# Patient Record
Sex: FEMALE | Race: WHITE | NOT HISPANIC OR LATINO | Employment: OTHER | URBAN - METROPOLITAN AREA
[De-identification: names, ages, dates, MRNs, and addresses within clinical notes are randomized per-mention and may not be internally consistent; named-entity substitution may affect disease eponyms.]

---

## 2017-03-28 ENCOUNTER — TELEPHONE (OUTPATIENT)
Dept: RHEUMATOLOGY | Facility: CLINIC | Age: 67
End: 2017-03-28

## 2017-03-28 NOTE — TELEPHONE ENCOUNTER
Called patient to confirm appointment at [facility] on 3/39/2017 , patient not available left message .

## 2017-05-08 ENCOUNTER — TELEPHONE (OUTPATIENT)
Dept: OBSTETRICS AND GYNECOLOGY | Facility: CLINIC | Age: 67
End: 2017-05-08

## 2017-05-08 NOTE — TELEPHONE ENCOUNTER
She thinks her breast implant is leaking.  One breast is smaller than the other.  Her  states the surgeon that placed the implants is no longer practicing.    Pt has a history of breast cancer.  She was seen in November for a breast lump but I do not see the results of the imaging in her chart.  Her  thinks she went to [facility].    Offered to schedule her with the nurse practitioner at [facility] for evaluation.  He did not schedule at this time.

## 2017-05-08 NOTE — TELEPHONE ENCOUNTER
Dr Garza pt,Phillip has [place] calling regarding [place] he states that she thinks her implant has ruptured and I wasn't sure if she should come to see dr garza or not.She had breast cancer and has implants. Brayan [phone]

## 2017-05-09 NOTE — TELEPHONE ENCOUNTER
MD Bridgett Mao RN        Caller: Unspecified (Yesterday, 12:09 PM)                     So you referred taina to a breast surgeon?  If so, that's what I would have done too       Scheduled with Anita Lr at [facility] 5/10 at 1000.     Spoke with  at [facility] who spoke with Anita who recommended she breast surgeon.      Gave  number to [facility] to schedule an appt at a convenient time [phone].

## 2017-08-17 ENCOUNTER — TELEPHONE (OUTPATIENT)
Dept: OBSTETRICS AND GYNECOLOGY | Facility: CLINIC | Age: 67
End: 2017-08-17

## 2018-02-19 ENCOUNTER — TELEPHONE (OUTPATIENT)
Dept: OBSTETRICS AND GYNECOLOGY | Facility: CLINIC | Age: 68
End: 2018-02-19

## 2018-02-28 ENCOUNTER — OFFICE VISIT (OUTPATIENT)
Dept: OBSTETRICS AND GYNECOLOGY | Facility: CLINIC | Age: 68
End: 2018-02-28
Payer: MEDICARE

## 2018-02-28 VITALS
DIASTOLIC BLOOD PRESSURE: 60 MMHG | HEIGHT: 61 IN | BODY MASS INDEX: 16.35 KG/M2 | WEIGHT: 86.63 LBS | SYSTOLIC BLOOD PRESSURE: 100 MMHG

## 2018-02-28 DIAGNOSIS — Z98.82 PRESENCE OF BILATERAL BREAST IMPLANT: ICD-10-CM

## 2018-02-28 DIAGNOSIS — N63.22 BREAST LUMP ON LEFT SIDE AT 10 O'CLOCK POSITION: ICD-10-CM

## 2018-02-28 DIAGNOSIS — N63.12 BREAST LUMP ON RIGHT SIDE AT 1 O'CLOCK POSITION: ICD-10-CM

## 2018-02-28 DIAGNOSIS — Z85.3 HISTORY OF BREAST CANCER: Primary | ICD-10-CM

## 2018-02-28 PROCEDURE — 99213 OFFICE O/P EST LOW 20 MIN: CPT | Mod: PBBFAC,PN | Performed by: NURSE PRACTITIONER

## 2018-02-28 PROCEDURE — 99213 OFFICE O/P EST LOW 20 MIN: CPT | Mod: S$PBB,,, | Performed by: NURSE PRACTITIONER

## 2018-02-28 PROCEDURE — 99999 PR PBB SHADOW E&M-EST. PATIENT-LVL III: CPT | Mod: PBBFAC,,, | Performed by: NURSE PRACTITIONER

## 2018-02-28 RX ORDER — FUROSEMIDE 40 MG/1
TABLET ORAL
COMMUNITY
Start: 2018-02-20 | End: 2019-11-15 | Stop reason: SDUPTHER

## 2018-02-28 RX ORDER — BUTALBITAL, ACETAMINOPHEN AND CAFFEINE 50; 325; 40 MG/1; MG/1; MG/1
TABLET ORAL
COMMUNITY
Start: 2018-02-23 | End: 2019-10-28

## 2018-02-28 RX ORDER — CEFDINIR 300 MG/1
CAPSULE ORAL
COMMUNITY
Start: 2018-02-23 | End: 2019-01-30

## 2018-02-28 RX ORDER — POTASSIUM CHLORIDE 750 MG/1
TABLET, EXTENDED RELEASE ORAL
COMMUNITY
Start: 2017-12-15

## 2018-02-28 RX ORDER — OXYCODONE HYDROCHLORIDE 20 MG/1
TABLET, FILM COATED, EXTENDED RELEASE ORAL
Refills: 0 | COMMUNITY
Start: 2018-02-07 | End: 2019-10-28

## 2018-02-28 RX ORDER — TIZANIDINE 2 MG/1
TABLET ORAL
COMMUNITY
Start: 2017-11-27 | End: 2019-11-15 | Stop reason: SDUPTHER

## 2018-02-28 RX ORDER — MELOXICAM 15 MG/1
TABLET ORAL
Refills: 0 | COMMUNITY
Start: 2018-02-06 | End: 2019-11-15 | Stop reason: SDUPTHER

## 2018-02-28 RX ORDER — MOMETASONE FUROATE 1 MG/G
OINTMENT TOPICAL
COMMUNITY
Start: 2017-11-27 | End: 2019-10-28

## 2018-02-28 RX ORDER — CIPROFLOXACIN AND DEXAMETHASONE 3; 1 MG/ML; MG/ML
SUSPENSION/ DROPS AURICULAR (OTIC)
COMMUNITY
Start: 2018-02-23 | End: 2019-10-28

## 2018-02-28 RX ORDER — SULFAMETHOXAZOLE AND TRIMETHOPRIM 400; 80 MG/1; MG/1
1 TABLET ORAL 2 TIMES DAILY
Refills: 0 | COMMUNITY
Start: 2018-01-10 | End: 2019-01-30

## 2018-02-28 RX ORDER — CEPHALEXIN 500 MG/1
CAPSULE ORAL
COMMUNITY
Start: 2018-01-30 | End: 2019-10-28

## 2018-02-28 RX ORDER — TOPIRAMATE 25 MG/1
TABLET ORAL
COMMUNITY
Start: 2018-02-26 | End: 2019-11-15 | Stop reason: SDUPTHER

## 2018-02-28 RX ORDER — MUPIROCIN 20 MG/G
OINTMENT TOPICAL
Refills: 0 | COMMUNITY
Start: 2017-12-14 | End: 2019-10-28

## 2018-02-28 NOTE — PROGRESS NOTES
"Chief Complaint: Breast, lump to left     (Dr. Álvarez patient)    Last Pap: 2016  Normal,  HPV negative    Last Mammogram/Breast Imaging:    HPI:      [place] Alexandre is a 67 y.o.  who presents complaining of lump to left breast.  States lump is non-tender, but round and firm; denies changes in skin texture/color.  She has bilateral breast implants, which have been replaced several times since her complete double mastectomy was done.  Patient does not have regular monthly menses. No LMP recorded. Patient is postmenopausal.       ROS:     GENERAL: Denies unintentional weight gain or weight loss. Feeling well overall.   ABDOMEN: Denies abdominal pain, constipation, diarrhea, nausea, vomiting, change in appetite.   URINARY: Denies frequency, dysuria, hematuria.  BREAST: See HPI  GYN: Denies abnormal bleeding or discharge.    Physical Exam:      PHYSICAL EXAM:  /60   Ht 5' 1" (1.549 m)   Wt 39.3 kg (86 lb 10.3 oz)   BMI 16.37 kg/m²   Body mass index is 16.37 kg/m².      APPEARANCE: Well nourished, well developed, in no acute distress.  BREASTS: Left breast:  approx 2 x 2 cm round mass noted directly above areola at 10 o'clock; feels as though it may c/w valve on implant.  Non-tender, non-mobile; no changes in skin texture/color; no other masses/lumps palpated.                       Right:  Soft, non-tender; small lump also noted, approx 1 x 1 cm at 1 o'clock position; again feels like may be c/w implant valve.  Non-mobile; no other masses/lumps noted; no changes in skin texture/color.  ABDOMEN: Soft.  No tenderness or masses.    EXTREMITIES: No edema.         Assessment/Plan:   History of breast cancer  -     Mammo Digital Diagnostic Bilat with [place]; Future  -     US Breast Bilateral Complete; Future; Expected date: 2018    Breast lump on left side at 10 o'clock position  -     Mammo Digital Diagnostic Bilat with [facility]; Future  -     US Breast Bilateral Complete; Future; Expected date: " 02/28/2018    Breast lump on right side at 1 o'clock position  -     Mammo Digital Diagnostic Bilat with [facility]; Future  -     US Breast Bilateral Complete; Future; Expected date: 02/28/2018    Presence of bilateral breast implant  -     Mammo Digital Diagnostic Bilat with [facility]; Future  -     US Breast Bilateral Complete; Future; Expected date: 02/28/2018          Orders Placed This Encounter   Procedures    Mammo Digital Diagnostic Bilat with [facility]     Standing Status:   Future     Number of Occurrences:   1     Standing Expiration Date:   4/30/2019     Order Specific Question:   Reason for Exam:     Answer:   Diagnostic: lump Left breast @ 10 o'clock (2 x 2 cm);  lump Right breast ! 1 o'clock (1x1 cm)     Order Specific Question:   May the Radiologist modify the order per protocol to meet the clinical needs of the patient?     Answer:   Yes    US Breast Bilateral Complete     Standing Status:   Future     Number of Occurrences:   1     Standing Expiration Date:   4/28/2019     Order Specific Question:   Reason for Exam:     Answer:   Diagnostic: lump Left breast @ 10 o'clock (2 x 2 cm);  lump Right breast ! 1 o'clock (1x1 cm)     Order Specific Question:   May the Radiologist modify the order per protocol to meet the clinical needs of the patient?     Answer:   Yes         Follow-up if symptoms worsen or fail to improve.

## 2018-04-12 ENCOUNTER — TELEPHONE (OUTPATIENT)
Dept: OBSTETRICS AND GYNECOLOGY | Facility: CLINIC | Age: 68
End: 2018-04-12

## 2019-01-28 ENCOUNTER — TELEPHONE (OUTPATIENT)
Dept: OBSTETRICS AND GYNECOLOGY | Facility: CLINIC | Age: 69
End: 2019-01-28

## 2019-01-28 NOTE — TELEPHONE ENCOUNTER
Pt c/o breast pain and both breasts are hard.  She has a h/o breast cancer and had a mastectomy.  Has had saline implants for about 10 years.  Requesting an appt for a breast exam.  Scheduled Wednesday with Birdie.  Last mammo 3/27/18

## 2019-01-30 ENCOUNTER — OFFICE VISIT (OUTPATIENT)
Dept: OBSTETRICS AND GYNECOLOGY | Facility: CLINIC | Age: 69
End: 2019-01-30
Payer: MEDICARE

## 2019-01-30 DIAGNOSIS — T85.49XA RIPPLING OF BREAST IMPLANT, INITIAL ENCOUNTER: ICD-10-CM

## 2019-01-30 DIAGNOSIS — N64.52 DISCHARGE FROM RIGHT NIPPLE: ICD-10-CM

## 2019-01-30 DIAGNOSIS — Z90.13 HX OF BILATERAL MASTECTOMY: ICD-10-CM

## 2019-01-30 DIAGNOSIS — Z98.82 PRESENCE OF BILATERAL BREAST IMPLANT: ICD-10-CM

## 2019-01-30 DIAGNOSIS — N64.4 BILATERAL MASTODYNIA: Primary | ICD-10-CM

## 2019-01-30 DIAGNOSIS — Z85.3 PERSONAL HISTORY OF BREAST CANCER: ICD-10-CM

## 2019-01-30 PROCEDURE — 99213 OFFICE O/P EST LOW 20 MIN: CPT | Mod: S$PBB,,, | Performed by: NURSE PRACTITIONER

## 2019-01-30 PROCEDURE — 99213 PR OFFICE/OUTPT VISIT, EST, LEVL III, 20-29 MIN: ICD-10-PCS | Mod: S$PBB,,, | Performed by: NURSE PRACTITIONER

## 2019-01-30 PROCEDURE — 99999 PR PBB SHADOW E&M-EST. PATIENT-LVL V: CPT | Mod: PBBFAC,,, | Performed by: NURSE PRACTITIONER

## 2019-01-30 PROCEDURE — 99215 OFFICE O/P EST HI 40 MIN: CPT | Mod: PBBFAC,PN | Performed by: NURSE PRACTITIONER

## 2019-01-30 PROCEDURE — 99999 PR PBB SHADOW E&M-EST. PATIENT-LVL V: ICD-10-PCS | Mod: PBBFAC,,, | Performed by: NURSE PRACTITIONER

## 2019-01-30 RX ORDER — FLUTICASONE PROPIONATE 50 MCG
SPRAY, SUSPENSION (ML) NASAL
COMMUNITY
End: 2019-10-28

## 2019-01-30 RX ORDER — BUTALBITAL, ACETAMINOPHEN, CAFFEINE AND CODEINE PHOSPHATE 50; 325; 40; 30 MG/1; MG/1; MG/1; MG/1
CAPSULE ORAL
COMMUNITY
End: 2019-10-28

## 2019-01-30 RX ORDER — CLOBETASOL PROPIONATE 0.5 MG/G
CREAM TOPICAL
COMMUNITY
End: 2019-10-28

## 2019-01-30 RX ORDER — LEVOFLOXACIN 500 MG/1
TABLET, FILM COATED ORAL
COMMUNITY
End: 2019-10-28

## 2019-01-30 RX ORDER — CLINDAMYCIN HYDROCHLORIDE 150 MG/1
CAPSULE ORAL
COMMUNITY
End: 2019-10-28

## 2019-01-30 RX ORDER — GABAPENTIN 100 MG/1
CAPSULE ORAL
COMMUNITY
End: 2019-10-28

## 2019-01-30 RX ORDER — ALBUTEROL SULFATE 0.83 MG/ML
SOLUTION RESPIRATORY (INHALATION)
COMMUNITY
End: 2019-11-15 | Stop reason: SDUPTHER

## 2019-01-30 RX ORDER — BUTALBITAL, ASPIRIN, CAFFEINE AND CODEINE PHOSPHATE 50; 325; 40; 30 MG/1; MG/1; MG/1; MG/1
CAPSULE ORAL
COMMUNITY
End: 2019-10-28

## 2019-01-30 RX ORDER — DOXYCYCLINE 100 MG/1
CAPSULE ORAL
COMMUNITY
End: 2019-10-28

## 2019-01-30 RX ORDER — CEFUROXIME AXETIL 250 MG/1
TABLET ORAL
COMMUNITY
End: 2019-10-28

## 2019-01-30 RX ORDER — HYDROCODONE BITARTRATE AND ACETAMINOPHEN 7.5; 325 MG/1; MG/1
TABLET ORAL
COMMUNITY
End: 2019-10-28

## 2019-01-30 RX ORDER — METHADONE HYDROCHLORIDE 5 MG/1
TABLET ORAL
COMMUNITY
End: 2019-10-28

## 2019-01-30 RX ORDER — CYCLOSPORINE 0.5 MG/ML
EMULSION OPHTHALMIC
COMMUNITY
End: 2019-11-15 | Stop reason: SDUPTHER

## 2019-01-30 RX ORDER — SULFACETAMIDE SODIUM AND PREDNISOLONE SODIUM PHOSPHATE 100; 2.3 MG/ML; MG/ML
SOLUTION/ DROPS OPHTHALMIC
COMMUNITY
End: 2019-10-28

## 2019-01-30 RX ORDER — METHADONE HYDROCHLORIDE 10 MG/1
TABLET ORAL
COMMUNITY

## 2019-01-30 RX ORDER — HYDROCORTISONE 25 MG/G
CREAM TOPICAL
COMMUNITY
End: 2019-10-28

## 2019-01-30 RX ORDER — FLUOROMETHOLONE 1 MG/ML
SUSPENSION/ DROPS OPHTHALMIC
COMMUNITY
End: 2019-10-28

## 2019-01-30 RX ORDER — HYDROCODONE BITARTRATE AND ACETAMINOPHEN 10; 325 MG/1; MG/1
TABLET ORAL
COMMUNITY
End: 2019-10-28

## 2019-01-30 RX ORDER — FLUOCINONIDE TOPICAL SOLUTION USP, 0.05% 0.5 MG/ML
SOLUTION TOPICAL
COMMUNITY
End: 2019-10-28

## 2019-01-30 RX ORDER — MUPIROCIN 20 MG/G
OINTMENT TOPICAL
COMMUNITY
End: 2019-10-28

## 2019-01-30 RX ORDER — LEVOFLOXACIN 750 MG/1
TABLET ORAL
COMMUNITY
End: 2019-10-28

## 2019-01-30 RX ORDER — KETOCONAZOLE 20 MG/ML
SHAMPOO, SUSPENSION TOPICAL
COMMUNITY
End: 2019-10-28

## 2019-01-30 RX ORDER — HYDROCORTISONE BUTYRATE 1 MG/ML
SOLUTION TOPICAL
COMMUNITY
End: 2019-10-28

## 2019-01-30 RX ORDER — FROVATRIPTAN SUCCINATE 2.5 MG/1
TABLET, FILM COATED ORAL
COMMUNITY
End: 2019-10-28

## 2019-01-30 RX ORDER — CYPROHEPTADINE HYDROCHLORIDE 4 MG/1
TABLET ORAL
COMMUNITY
Start: 2018-10-31 | End: 2019-10-28

## 2019-01-30 RX ORDER — ERYTHROMYCIN 5 MG/G
OINTMENT OPHTHALMIC
COMMUNITY
End: 2019-10-28

## 2019-01-30 RX ORDER — DESONIDE 0.5 MG/G
CREAM TOPICAL
COMMUNITY
End: 2019-10-28

## 2019-01-30 RX ORDER — DOXEPIN HYDROCHLORIDE 10 MG/1
CAPSULE ORAL
COMMUNITY
End: 2019-11-18

## 2019-01-30 RX ORDER — CLOTRIMAZOLE AND BETAMETHASONE DIPROPIONATE 10; .64 MG/G; MG/G
CREAM TOPICAL
COMMUNITY
End: 2019-10-28

## 2019-01-30 RX ORDER — CIPROFLOXACIN 500 MG/1
TABLET ORAL
COMMUNITY
End: 2019-10-28

## 2019-01-30 RX ORDER — SERTRALINE HYDROCHLORIDE 50 MG/1
TABLET, FILM COATED ORAL
COMMUNITY
End: 2019-11-15 | Stop reason: SDUPTHER

## 2019-01-30 RX ORDER — FLUCONAZOLE 100 MG/1
TABLET ORAL
COMMUNITY
End: 2019-11-15 | Stop reason: SDUPTHER

## 2019-01-30 RX ORDER — BENZONATATE 100 MG/1
CAPSULE ORAL
COMMUNITY
End: 2019-10-28

## 2019-01-30 RX ORDER — CYPROHEPTADINE HYDROCHLORIDE 4 MG/1
TABLET ORAL
COMMUNITY
End: 2019-10-28

## 2019-01-30 RX ORDER — ONDANSETRON 4 MG/1
TABLET, FILM COATED ORAL
COMMUNITY
End: 2019-10-28

## 2019-01-30 RX ORDER — MOMETASONE FUROATE 1 MG/G
OINTMENT TOPICAL
COMMUNITY
End: 2019-10-28

## 2019-01-30 RX ORDER — CLONAZEPAM 1 MG/1
TABLET ORAL
COMMUNITY
End: 2019-10-28

## 2019-01-30 RX ORDER — NYSTATIN 100000 [USP'U]/ML
SUSPENSION ORAL
COMMUNITY
End: 2019-11-15 | Stop reason: SDUPTHER

## 2019-01-30 RX ORDER — GENTAMICIN SULFATE 0.3 %
OINTMENT (GRAM) OPHTHALMIC (EYE)
COMMUNITY
End: 2019-10-28

## 2019-01-30 RX ORDER — MYCOPHENOLATE MOFETIL 500 MG/1
TABLET ORAL
COMMUNITY

## 2019-01-30 RX ORDER — CIPROFLOXACIN AND DEXAMETHASONE 3; 1 MG/ML; MG/ML
SUSPENSION/ DROPS AURICULAR (OTIC)
COMMUNITY
End: 2019-10-28

## 2019-01-30 RX ORDER — LIDOCAINE HYDROCHLORIDE 20 MG/ML
SOLUTION OROPHARYNGEAL
COMMUNITY

## 2019-01-31 VITALS
HEIGHT: 61 IN | SYSTOLIC BLOOD PRESSURE: 98 MMHG | DIASTOLIC BLOOD PRESSURE: 58 MMHG | BODY MASS INDEX: 17.65 KG/M2 | WEIGHT: 93.5 LBS

## 2019-02-01 NOTE — PROGRESS NOTES
"Chief Complaint: Breast pain, bilateral x 3 weeks     (Dr. Armando patient)    Last Mammogram/Breast Imagin/27/18 (done at [facility] - "+ bilateral saline breast implants (some implant valves palpable")    HPI:      [place] Alexandre is a 68 y.o.  who presents complaining of bilateral breast pain x 3 weeks.   Has h/o breast cancer in , with subsequent bilateral mastectomy; since then has had her implants replaced twice due to leakage.  She states her current implants have been in place for at least 20 years.  She also feels lumps in a spot in each breast.  Denies nipple discharge to either breast; reports visible rippling to both breasts and states they both feel like they have gotten harder over the years.    Patient does not have regular monthly menses. No LMP recorded. Patient is postmenopausal.       Past Medical History:   Diagnosis Date    Breast cancer     bilateral mastectomy, chemo, XRT    Depression     GERD (gastroesophageal reflux disease)     Lupus     Migraine     Shingles        Past Surgical History:   Procedure Laterality Date    APPENDECTOMY  1965    BRAIN SURGERY      x4 for trigeminal neuralgia    BREAST SURGERY      - silicone implants replaced x 2 due to leak    CHOLECYSTECTOMY  1979    MASTECTOMY Bilateral 1983    TONSILLECTOMY, ADENOIDECTOMY  1950         ROS:     ROS:    Review of Systems   Musculoskeletal: Positive for myalgias (reports pain to both breasts; and lump to each breast).   All other systems reviewed and are negative.      Physical Exam:     BP (!) 98/58   Ht 5' 1" (1.549 m)   Wt 42.4 kg (93 lb 7.6 oz)   BMI 17.66 kg/m²   Body mass index is 17.66 kg/m².     Physical Exam:    Physical Exam:   Constitutional: She is oriented to person, place, and time. Vital signs are normal. She appears well-developed and well-nourished.    HENT:   Head: Normocephalic.    Eyes: Pupils are equal, round, and reactive to light.      Pulmonary/Chest: Effort " normal and breath sounds normal. Right breast exhibits mass, nipple discharge (scant amount clear pale yellow upon performing breast exam), skin change (rippling noted in skin superficially from implants) and tenderness (mild). Right breast exhibits no inverted nipple, presence, no bleeding and no swelling. Left breast exhibits mass, skin change (rippling noted in skin superficially from implants) and tenderness (mild). Left breast exhibits no inverted nipple, no nipple discharge, presence, no bleeding and no swelling. Breasts are symmetrical.            Abdominal: Normal appearance. There is no rigidity.             Musculoskeletal: Normal range of motion and moves all extremeties.       Neurological: She is alert and oriented to person, place, and time.    Skin: Skin is warm and dry. No rash noted.    Psychiatric: She has a normal mood and affect. Her speech is normal and behavior is normal. Judgment and thought content normal. Cognition and memory are normal.       Assessment/Plan:     Bilateral mastodynia  -     Mammo Digital Diagnostic Bilat w/ [facility]; Future  -     US Breast Bilateral Complete; Future; Expected date: 01/30/2019    Presence of bilateral breast implant  -     Mammo Digital Diagnostic Bilat w/ [facility]; Future  -     US Breast Bilateral Complete; Future; Expected date: 01/30/2019    Personal history of breast cancer  -     Mammo Digital Diagnostic Bilat w/ [facility]; Future  -     US Breast Bilateral Complete; Future; Expected date: 01/30/2019    Hx of bilateral mastectomy  -     Mammo Digital Diagnostic Bilat w/ [facility]; Future  -     US Breast Bilateral Complete; Future; Expected date: 01/30/2019    Discharge from right nipple  -     Mammo Digital Diagnostic Bilat w/ [facility]; Future  -     US Breast Bilateral Complete; Future; Expected date: 01/30/2019    Rippling of breast implant, initial encounter  -     Mammo Digital Diagnostic Bilat w/ [facility]; Future  -     US Breast Bilateral Complete; Future; Expected  date: 01/30/2019        Follow-up if symptoms worsen or fail to improve.      * Patient aware that she will be notified once her finalized results have been reviewed by her Provider, either via her [facility] patient portal, or via telephone call.

## 2019-02-04 ENCOUNTER — HOSPITAL ENCOUNTER (OUTPATIENT)
Dept: RADIOLOGY | Facility: HOSPITAL | Age: 69
Discharge: HOME | End: 2019-02-04
Attending: NURSE PRACTITIONER
Payer: MEDICARE

## 2019-02-04 DIAGNOSIS — Z98.82 PRESENCE OF BILATERAL BREAST IMPLANT: ICD-10-CM

## 2019-02-04 DIAGNOSIS — T85.49XA RIPPLING OF BREAST IMPLANT, INITIAL ENCOUNTER: ICD-10-CM

## 2019-02-04 DIAGNOSIS — N64.52 DISCHARGE FROM RIGHT NIPPLE: ICD-10-CM

## 2019-02-04 DIAGNOSIS — Z90.13 HX OF BILATERAL MASTECTOMY: ICD-10-CM

## 2019-02-04 DIAGNOSIS — Z85.3 PERSONAL HISTORY OF BREAST CANCER: ICD-10-CM

## 2019-02-04 DIAGNOSIS — N64.4 BILATERAL MASTODYNIA: ICD-10-CM

## 2019-02-04 PROCEDURE — 76642 US BREAST RIGHT LIMITED: ICD-10-PCS | Mod: 26,RT,, | Performed by: RADIOLOGY

## 2019-02-04 PROCEDURE — 76642 ULTRASOUND BREAST LIMITED: CPT | Mod: TC,PO,RT

## 2019-02-04 PROCEDURE — 77066 MAMMO DIGITAL DIAGNOSTIC BILAT WITH CAD: ICD-10-PCS | Mod: 26,,, | Performed by: RADIOLOGY

## 2019-02-04 PROCEDURE — 76642 ULTRASOUND BREAST LIMITED: CPT | Mod: 26,RT,, | Performed by: RADIOLOGY

## 2019-02-04 PROCEDURE — 77066 DX MAMMO INCL CAD BI: CPT | Mod: TC,PO

## 2019-02-04 PROCEDURE — 77066 DX MAMMO INCL CAD BI: CPT | Mod: 26,,, | Performed by: RADIOLOGY

## 2019-02-19 NOTE — PROGRESS NOTES
",   This pt of yours was seen by me for c/o "bilateral breast pain x 3 weeks.   Has h/o breast cancer in 1983, with subsequent bilateral mastectomy; since then has had her implants replaced twice due to leakage.  She states her current implants have been in place for at least 20 years.  She also feels lumps in a spot in each breast.  Denies nipple discharge to either breast; reports visible rippling to both breasts and states they both feel like they have gotten harder over the years."    She had DIAG bilat mammo and U/S of right breast.  The radiologist's final impression was:            "There is no mammographic or sonographic evidence of malignancy.  Clinical evaluation and follow-up is recommended.               BI-RADS Category:   Overall: 2 - Benign"    Do you want to do anything further at this point?"

## 2019-04-05 DIAGNOSIS — Z85.3 PERSONAL HISTORY OF BREAST CANCER: Primary | ICD-10-CM

## 2019-04-05 DIAGNOSIS — N64.4 BILATERAL MASTODYNIA: ICD-10-CM

## 2019-04-05 DIAGNOSIS — Z98.82 PRESENCE OF BILATERAL BREAST IMPLANT: ICD-10-CM

## 2019-04-05 DIAGNOSIS — Z90.13 HX OF BILATERAL MASTECTOMY: ICD-10-CM

## 2019-04-05 DIAGNOSIS — N64.52 DISCHARGE FROM RIGHT NIPPLE: ICD-10-CM

## 2019-04-08 ENCOUNTER — TELEPHONE (OUTPATIENT)
Dept: OBSTETRICS AND GYNECOLOGY | Facility: CLINIC | Age: 69
End: 2019-04-08

## 2019-04-08 NOTE — TELEPHONE ENCOUNTER
"----- Message from Birdie Goodson NP sent at 4/5/2019 11:14 PM CDT -----  Regarding: Consult to Breast Specialist  Yonathan Osborne,               I'm putting in orders for this patient of 's for an Ambulatory Consult to Breast Surgeon.  Can you please call the patient and let her know that Dr. Álvarez reviewed her breast imaging, and would like her to schedule a consult with one of the breast surgeons given her personal history of breast cancer, and recent nipple discharge.  I'm sending a message to the doctor's staff pool (probably either Dr. Reyes or Dr. Marinelli) to make sure she gets in to see someone soon.    Thank you,   Birdie                              ----- Message -----  From: Denise Álvarez MD  Sent: 2/20/2019  12:14 PM  To: Birdie Goodson NP    Refer her to breast center for a consult  ----- Message -----  From: Birdie Goodson NP  Sent: 2/18/2019  10:57 PM  To: MD Lisa Mao,   This pt of yours was seen by me for c/o "bilateral breast pain x 3 weeks.   Has h/o breast cancer in 1983, with subsequent bilateral mastectomy; since then has had her implants replaced twice due to leakage.  She states her current implants have been in place for at least 20 years.  She also feels lumps in a spot in each breast.  Denies nipple discharge to either breast; reports visible rippling to both breasts and states they both feel like they have gotten harder over the years."    She had DIAG bilat mammo and U/S of right breast.  The radiologist's final impression was:            "There is no mammographic or sonographic evidence of malignancy.  Clinical evaluation and follow-up is recommended.               BI-RADS Category:   Overall: 2 - Benign"    Do you want to do anything further at this point?      "

## 2019-04-09 ENCOUNTER — TELEPHONE (OUTPATIENT)
Dept: SURGERY | Facility: CLINIC | Age: 69
End: 2019-04-09

## 2019-04-09 NOTE — TELEPHONE ENCOUNTER
I called pts cell phone and  answered and gave me  her Home # [phone] tried calling her it was busy.

## 2019-04-09 NOTE — TELEPHONE ENCOUNTER
----- Message from Cady Garibay RN sent at 4/9/2019  8:21 AM CDT -----  Regarding: FW: Needs soonest avail appt for Consult with   Hey there!  Will you give this patient a call for me.  We can get her in with Dr. Reyes on Thursday AM at [facility] for 11 or PM at [facility] for 2:30pm.  She had breast imaging in Feb but may need some now that she has nipple discharge.      Cady    ----- Message -----  From: Birdie Goodson NP  Sent: 4/9/2019  To: Eric RAMSEY Staff  Subject: Needs soonest avail appt for Consult with Adela Mcmanus,               I have placed an order for an Ambulatory Referral to Breast Surgery with  for this patient of 's.  I just wanted to make sure she can get in to see  at her soonest available appointment.  My assistant Janice will call patient on Monday to let her know we've placed the orders for consult.                Ms. Alexandre has a personal history of breast cancer, and was seen several weeks ago for c/o nipple discharge, and had mammo and u/s done at that time as well.   would like her to have a consult please.    Thank you,   YUDELKA Mckay

## 2019-04-09 NOTE — TELEPHONE ENCOUNTER
Contacted the patient regarding scheduling an appointment with .  The patient did not answer, message left for the patient to contact our office regarding this matter.

## 2019-04-10 ENCOUNTER — TELEPHONE (OUTPATIENT)
Dept: SURGERY | Facility: CLINIC | Age: 69
End: 2019-04-10

## 2019-04-10 NOTE — TELEPHONE ENCOUNTER
I tried calling pt and it was busy I mailed pt a letter with the information and to call us when she makes her apt .

## 2019-04-10 NOTE — TELEPHONE ENCOUNTER
Contacted the patient regarding the message below.  The patient did not answer, message left with my name and direct number for the patient to contact our office regarding scheduling an appointment with .  Also, I spoke with the patient's  who stated he will have her contact our office to setup the appointment.  I gave the patient's  my name and direct number as well.

## 2019-04-10 NOTE — TELEPHONE ENCOUNTER
----- Message from Cady Garibay RN sent at 4/9/2019  8:21 AM CDT -----  Regarding: FW: Needs soonest avail appt for Consult with   Hey there!  Will you give this patient a call for me.  We can get her in with Dr. Reyes on Thursday AM at [facility] for 11 or PM at [facility] for 2:30pm.  She had breast imaging in Feb but may need some now that she has nipple discharge.      Cady    ----- Message -----  From: Birdie Goodson NP  Sent: 4/9/2019  To: Eric RAMSEY Staff  Subject: Needs soonest avail appt for Consult with Adela Mcmanus,               I have placed an order for an Ambulatory Referral to Breast Surgery with  for this patient of 's.  I just wanted to make sure she can get in to see  at her soonest available appointment.  My assistant Janice will call patient on Monday to let her know we've placed the orders for consult.                Ms. Alexandre has a personal history of breast cancer, and was seen several weeks ago for c/o nipple discharge, and had mammo and u/s done at that time as well.   would like her to have a consult please.    Thank you,   YUDELKA Mckay

## 2019-04-15 ENCOUNTER — TELEPHONE (OUTPATIENT)
Dept: SURGERY | Facility: CLINIC | Age: 69
End: 2019-04-15

## 2019-04-15 NOTE — TELEPHONE ENCOUNTER
----- Message from Candelaria Greenberg MA sent at 4/15/2019  2:35 PM CDT -----  Contact: Brayan Pascual (Spouse)      ----- Message -----  From: Seda Givens  Sent: 4/15/2019   2:23 PM  To: Eric Monaco    Pt's  calling to schedule pt a consult soon.    [phone]

## 2019-04-15 NOTE — TELEPHONE ENCOUNTER
Returned the caller nicci regarding the message below.  The caller called to schedule an appointment for his wife with .  The patient is scheduled to see  on Monday 4/22/19 at 3 pm.  The patient's  requested a Monday appointment as he stated that he is off on Monday's and he drives her to her appointments.  The patient  voice understanding of appointment date, time, and location.  Reminder letter mailed to the patient.

## 2019-05-10 ENCOUNTER — TELEPHONE (OUTPATIENT)
Dept: SURGERY | Facility: CLINIC | Age: 69
End: 2019-05-10

## 2019-05-10 NOTE — TELEPHONE ENCOUNTER
----- Message from Cady Garibay RN sent at 5/10/2019  5:03 PM CDT -----  Contact:  - Brayan Soniwillow      ----- Message -----  From: Geeta Givens  Sent: 5/10/2019   4:52 PM  To: Eric RAMSEY Staff    Patient Returning Call from [facility]    Who Left Message for Patient: Carlos Enrique    Communication Preference: Phone     Additional Information:  states that Carlos nErique was suppose to call back to schedule his wife's breast surgery

## 2019-05-10 NOTE — TELEPHONE ENCOUNTER
Returned the callers call regarding the message below.  The patient's  called in to reschedule the patient's appointment with .  The patient is rescheduled to be seen on Wednesday 5/15/19 at 11:30 am with .  The patient's  voiced understanding of appointment date, time, and location.

## 2019-05-15 ENCOUNTER — OFFICE VISIT (OUTPATIENT)
Dept: SURGERY | Facility: CLINIC | Age: 69
End: 2019-05-15
Payer: MEDICARE

## 2019-05-15 VITALS
TEMPERATURE: 98 F | DIASTOLIC BLOOD PRESSURE: 63 MMHG | SYSTOLIC BLOOD PRESSURE: 94 MMHG | WEIGHT: 84.13 LBS | BODY MASS INDEX: 15.48 KG/M2 | HEIGHT: 62 IN | HEART RATE: 112 BPM

## 2019-05-15 DIAGNOSIS — Z85.3 PERSONAL HISTORY OF BREAST CANCER: Primary | ICD-10-CM

## 2019-05-15 PROCEDURE — 99204 PR OFFICE/OUTPT VISIT, NEW, LEVL IV, 45-59 MIN: ICD-10-PCS | Mod: S$PBB,,, | Performed by: SURGERY

## 2019-05-15 PROCEDURE — 99204 OFFICE O/P NEW MOD 45 MIN: CPT | Mod: S$PBB,,, | Performed by: SURGERY

## 2019-05-15 PROCEDURE — 99999 PR PBB SHADOW E&M-EST. PATIENT-LVL III: CPT | Mod: PBBFAC,,, | Performed by: SURGERY

## 2019-05-15 PROCEDURE — 99999 PR PBB SHADOW E&M-EST. PATIENT-LVL III: ICD-10-PCS | Mod: PBBFAC,,, | Performed by: SURGERY

## 2019-05-15 PROCEDURE — 99213 OFFICE O/P EST LOW 20 MIN: CPT | Mod: PBBFAC,PO | Performed by: SURGERY

## 2019-05-15 RX ORDER — LUBIPROSTONE 24 UG/1
CAPSULE, GELATIN COATED ORAL
Refills: 1 | COMMUNITY
Start: 2019-04-15 | End: 2019-10-28

## 2019-05-15 NOTE — PROGRESS NOTES
History and Physical  [facility]  Department of Surgery    REFERRING PROVIDER: Birdie Goodson, Jluis  [address]  [address]  [place] [address]    CHIEF COMPLAINT: breast pain, resolved.     Subjective:      Virginia Alexandre is a 69 y.o. postmenopausal female referred for evaluation of discharge of the right breast.  Reports she is unsure why she is here other than someone called her to come in.  She denies any discharge that she knows about.  Recently hospitalized for GI complaints and weight loss. Patient has a personal history of breast cancer in  treated with bilateral mastectomy and chemo. now s/p bilateral mastectomy with implant reconstruction x2 due to leakage of implant.     GYN History:      FAMILY History:  denies    Past Medical History:   Diagnosis Date    Breast cancer     bilateral mastectomy, chemo, XRT    Depression     GERD (gastroesophageal reflux disease)     Lupus     Migraine     Shingles      Past Surgical History:   Procedure Laterality Date    APPENDECTOMY  1965    AUGMENTATION OF BREAST      BRAIN SURGERY  's    x4 for trigeminal neuralgia    BREAST BIOPSY      BREAST RECONSTRUCTION      BREAST SURGERY      - silicone implants replaced x 2 due to leak    CHOLECYSTECTOMY  1979    MASTECTOMY Bilateral 1983    TONSILLECTOMY, ADENOIDECTOMY  1950     Current Outpatient Medications on File Prior to Visit   Medication Sig Dispense Refill    albuterol (PROVENTIL) 2.5 mg /3 mL (0.083 %) nebulizer solution albuterol sulfate 2.5 mg/3 mL (0.083 %) solution for nebulization      amitriptyline (ELAVIL) 50 MG tablet Take 50 mg by mouth every evening.      benzonatate (TESSALON) 100 MG capsule benzonatate 100 mg capsule   TAKE ONE CAPSULE BY MOUTH 3 TIMES A DAY AS NEEDED COUGH      butalbital-acetaminop-caf-cod -92-30 mg Cap butalbital 50 mg-acetaminophen 325 mg-caffeine 40 mg-codeine 30 mg cap   take 1 capsule by mouth once daily       butalbital-acetaminophen-caffeine -40 mg (FIORICET, ESGIC) -40 mg per tablet       cephALEXin (KEFLEX) 500 MG capsule       CIPRODEX 0.3-0.1 % DrpS       ciprofloxacin HCl (CIPRO) 500 MG tablet ciprofloxacin 500 mg tablet      ciprofloxacin-dexamethasone 0.3-0.1% (CIPRODEX) 0.3-0.1 % DrpS Ciprodex 0.3 %-0.1 % ear drops,suspension   instill 3 drops into left ear three times a day      clindamycin (CLEOCIN) 150 MG capsule clindamycin HCl 150 mg capsule   TAKE ONE CAPSULE BY MOUTH EVERY 6 HOURS FOR INFECTION      clobetasol (TEMOVATE) 0.05 % cream clobetasol 0.05 % topical cream      clonazePAM (KLONOPIN) 1 MG tablet clonazepam 1 mg tablet   TAKE 1 TABLET EVERY DAY      clotrimazole-betamethasone 1-0.05% (LOTRISONE) cream clotrimazole-betamethasone 1 %-0.05 % topical cream   APPLY TO AFFECTED AREA 2 TIMES A DAY FOR 2 WEEKS      codeine-butalbital-ASA-caffeine (BUTALBITAL COMPOUND W/CODEINE) 24--40 mg Cap Butalbital Compound with Codeine 30 mg-50 mg-325 mg-40 mg capsule   take 1 capsule by mouth once daily      cycloSPORINE (RESTASIS) 0.05 % ophthalmic emulsion Restasis 0.05 % eye drops in a dropperette      cyproheptadine (PERIACTIN) 4 mg tablet cyproheptadine 4 mg tablet      cyproheptadine (PERIACTIN) 4 mg tablet       desonide (DESOWEN) 0.05 % cream desonide 0.05 % topical cream   APPLY TO AFFECTED AREA OF NOSE 2 TO 3 TIMES A DAY WITH Q-TIP      doxepin (SINEQUAN) 10 MG capsule doxepin 10 mg capsule      doxycycline (VIBRAMYCIN) 100 MG Cap doxycycline hyclate 100 mg capsule   TAKE ONE CAPSULE BY MOUTH TWICE DAILY      erythromycin (ROMYCIN) ophthalmic ointment erythromycin 5 mg/gram (0.5 %) eye ointment   1 APPLICATION IN BOTH EYES AT BEDTIME      esomeprazole (NEXIUM) 40 MG capsule Take 40 mg by mouth once daily.      fluconazole (DIFLUCAN) 100 MG tablet fluconazole 100 mg tablet   TAKE 1 TABLET(S) BY MOUTH DAILY      fluocinonide (LIDEX) 0.05 % external solution fluocinonide  0.05 % topical solution   APPLY TO SCALP TWICE A DAY      fluorometholone 0.1% (FML) 0.1 % DrpS fluorometholone 0.1 % eye drops,suspension      fluticasone (FLONASE) 50 mcg/actuation nasal spray fluticasone 50 mcg/actuation nasal spray,suspension   INSTILL 2 SPRAYS IN EACH NOSTRIL DAILY      frovatriptan (FROVA) 2.5 MG tablet Frova 2.5 mg tablet   1 tablet po at onset of headache.  May take 2nd tablet po 2 hours or more later in the day.  Maximum dose is 2 tablets per 24 hours.      furosemide (LASIX) 40 MG tablet       gabapentin (NEURONTIN) 100 MG capsule gabapentin 100 mg capsule   TAKE ONE CAPSULE BY MOUTH 3 TIMES A DAY      gentamicin (GENTAK) 0.3 % (3 mg/gram) ophthalmic ointment Gentak 0.3 % (3 mg/gram) eye ointment   1 APPLICATION IN BOTH EYES AT BEDTIME      HYDROcodone-acetaminophen (NORCO)  mg per tablet hydrocodone 10 mg-acetaminophen 325 mg tablet   TAKE 1 TABLET 4 TIMES DAILY      HYDROcodone-acetaminophen (NORCO) 7.5-325 mg per tablet hydrocodone 7.5 mg-acetaminophen 325 mg tablet   TAKE 1 TABLET BY MOUTH EVERY 6 TO 8 HOURS AS NEEDED FOR PAIN      hydrocortisone (PROCTOSOL HC) 2.5 % rectal cream Proctosol HC 2.5 % rectal cream with applicator   APPLY RECTALLY AS NEEDED      hydrocortisone 2.5 % cream hydrocortisone 2.5 % topical cream   APPLY TO AFFECTED AREA TWICE A DAY      hydrocortisone butyrate 0.1 % Soln hydrocortisone butyrate 0.1 % topical solution      hydrocortisone-pramoxine (PRAMOSONE) 2.5-1 % Lotn lotion Pramosone 2.5 %-1 % lotion   APPLY TO RASH 3 TIMES A DAY      HYDROXYZINE PAMOATE (VISTARIL ORAL) once a day      ketoconazole (NIZORAL) 2 % shampoo ketoconazole 2 % shampoo   WASH THREE TIMES A WEEK, LET SIT FOR 5 MINUTES, THEN RINSE AFTER      KLOR-CON M10 10 mEq tablet       levoFLOXacin (LEVAQUIN) 500 MG tablet levofloxacin 500 mg tablet   TAKE 1 TABLET EVERY DAY (WITH FOOD)      levoFLOXacin (LEVAQUIN) 750 MG tablet levofloxacin 750 mg tablet   take 1 tablet by  mouth once daily      lidocaine HCl 2% (LIDOCAINE VISCOUS) 2 % Soln Lidocaine Viscous 2 % mucosal solution   SWISH AND SPIT 15 milliliters by mouth TWO TO THREE TIMES A DAY      LINACLOTIDE (LINZESS ORAL) Take 290 mcg by mouth once daily.      meloxicam (MOBIC) 15 MG tablet take 1 tablet by mouth once daily if needed for pain  0    methadone (DOLOPHINE) 10 MG tablet methadone 10 mg tablet   TAKE 1/2 TABLETS BY MOUTH EVERY 12 HOURS AS NEEDED FOR PAIN      methadone (DOLOPHINE) 5 MG tablet methadone 5 mg tablet      mirabegron (MYRBETRIQ) 25 mg Tb24 ER tablet Myrbetriq 25 mg tablet,extended release   TAKE 1 TABLET BY MOUTH DAILY      mometasone (ELOCON) 0.1 % ointment       mometasone (ELOCON) 0.1 % ointment mometasone 0.1 % topical ointment      montelukast (SINGULAIR) 10 mg tablet Take 10 mg by mouth every evening.      mupirocin (BACTROBAN) 2 % ointment apply to LESION once daily  0    mupirocin (BACTROBAN) 2 % ointment mupirocin 2 % topical ointment      mycophenolate (CELLCEPT) 500 mg Tab mycophenolate mofetil 500 mg tablet      nystatin (MYCOSTATIN) 100,000 unit/mL suspension nystatin 100,000 unit/mL oral suspension   TAKE 5 MLS 3 TIMES A DAY AS NEEDED FOR THRUSH      omeprazole-sodium bicarbonate (ZEGERID) 40-1.1 mg-gram per capsule Take 1 capsule by mouth before breakfast.      ondansetron (ZOFRAN) 4 MG tablet ondansetron HCl 4 mg tablet      OXYCODONE HCL/ACETAMINOPHEN (PERCOCET ORAL) Take  mg by mouth 3 (three) times daily.      OXYCONTIN 20 mg 12 hr tablet   0    pramipexole (MIRAPEX) 0.25 MG tablet Take 0.25 mg by mouth once daily.      sertraline (ZOLOFT) 50 MG tablet sertraline 50 mg tablet      sulfacetamide-prednisoLONE 10%-0.23%, 0.25%, (VASOCIDIN) 10 %-0.23 % (0.25 %) Drop sulfacetamide-prednisolone 10 %-0.23 % (0.25 %) eye drops   INSTILL 3 DROPS LEFT EAR 3 TIMES A DAY      sumatriptan (IMITREX STATDOSE PEN) 6 mg/0.5 mL kit Imitrex STATdose Pen 6 mg/0.5 mL subcutaneous pen  injector      tiZANidine (ZANAFLEX) 2 MG tablet       tobramycin sulfate 0.3% (TOBREX) 0.3 % ophthalmic ointment Tobrex 0.3 % eye ointment   APPLY 1 APPLICATION IN BOTH EYES AT BEDTIME      topiramate (TOPAMAX) 25 MG tablet       zoster vaccine live, PF, (ZOSTAVAX, PF,) 19,400 unit/0.65 mL injection Zostavax (PF) 19,400 unit/0.65 mL subcutaneous suspension   TO BE ADMINISTERED BY PHARMACIST FOR IMMUNIZATION       No current facility-administered medications on file prior to visit.      Social History     Socioeconomic History    Marital status:      Spouse name: Not on file    Number of children: Not on file    Years of education: Not on file    Highest education level: Not on file   Occupational History    Not on file   Social Needs    Financial resource strain: Not on file    Food insecurity:     Worry: Not on file     Inability: Not on file    Transportation needs:     Medical: Not on file     Non-medical: Not on file   Tobacco Use    Smoking status: Current Every Day Smoker     Packs/day: 1.00    Smokeless tobacco: Never Used   Substance and Sexual Activity    Alcohol use: No    Drug use: Not on file    Sexual activity: Not on file   Lifestyle    Physical activity:     Days per week: Not on file     Minutes per session: Not on file    Stress: Not on file   Relationships    Social connections:     Talks on phone: Not on file     Gets together: Not on file     Attends Yazdanism service: Not on file     Active member of club or organization: Not on file     Attends meetings of clubs or organizations: Not on file     Relationship status: Not on file   Other Topics Concern    Are you pregnant or think you may be? Not Asked    Breast-feeding Not Asked   Social History Narrative    Not on file     Family History   Problem Relation Age of Onset    Diabetes Mother     Miscarriages / Stillbirths Mother     Throat cancer Brother     Melanoma Neg Hx        Review of Systems  Pertinent items  are noted in HPI.       Objective:        There were no vitals taken for this visit.    General Appearance:    Alert, cooperative, no distress, appears stated age   Head:    Normocephalic, without obvious abnormality, atraumatic   Eyes:    PERRL, lids normal   Neck:   Supple, symmetrical, no adenopathy   Lungs:     respirations unlabored; no obvious deformity   Chest Wall:    No tenderness or deformity   Heart::   Regular rate and rhythm   Abdomen:     Soft, non-tender, nondistended   Extremities:   Extremities normal, atraumatic   Skin:   Skin color, texture, turgor normal, no rashes or lesions   Lymph nodes:   No Cervical or supraclavicular adenopathy   Neuro/Psych:   Alert and oriented, good judgement   BREAST exam:  Left: no masses, skin changes. No nipple discharge or inverted nipple.  No axillary LAD. Implant with contracture in place.  Right: no masses, skin changes. No nipple discharge or inverted nipple.  No axillary LAD. Implant with contracture in place.      Radiology review: Images personally reviewed by me in the clinic.  Mammogram 01/30/19--No mammographic evidence of malignancy. BIRADS 2.   Ultrasound 01/30/19- No sonographic evidence of malignancy.         Assessment:      Virginia Alexandre is a 69 y.o. postmenopausal female with right nipple discharge, none on exam today.     Plan:   Nothing suspicious on exam.  Reassurance given.    Discussed genetic testing given age 31 at breast cancer diagnosis. She wants to think about it.

## 2019-05-15 NOTE — LETTER
May 15, 2019      Birdie Goodson, NP  [address]  [address]  [facility] [address]           [facility]  [address]  [facility] [address]  Phone: [phone]  Fax: [phone]          Patient: Virginia Alexandre   MR Number: [id number]   YOB: 1950   Date of Visit: 5/15/2019       Dear Birdie Goodson:    Thank you for referring Virginia Alexandre to me for evaluation. Attached you will find relevant portions of my assessment and plan of care.    If you have questions, please do not hesitate to call me. I look forward to following Virginia Alexandre along with you.    Sincerely,    Winifred Reyes MD    Enclosure  CC:  No Recipients    If you would like to receive this communication electronically, please contact [email or web address] or [phone] to request more information on [employer] access.    For providers and/or their staff who would like to refer a patient to [facility], please contact us through our one-stop-shop provider referral line, [facility], at [phone].    If you feel you have received this communication in error or would no longer like to receive these types of communications, please e-mail [email or web address]

## 2019-07-22 ENCOUNTER — TELEPHONE (OUTPATIENT)
Dept: OBSTETRICS AND GYNECOLOGY | Facility: CLINIC | Age: 69
End: 2019-07-22

## 2019-07-22 DIAGNOSIS — R30.0 DYSURIA: Primary | ICD-10-CM

## 2019-07-22 RX ORDER — SULFAMETHOXAZOLE AND TRIMETHOPRIM 400; 80 MG/1; MG/1
1 TABLET ORAL 2 TIMES DAILY
Qty: 6 TABLET | Refills: 0 | Status: SHIPPED | OUTPATIENT
Start: 2019-07-22 | End: 2019-07-25

## 2019-07-22 NOTE — TELEPHONE ENCOUNTER
Pt c/o urinary burning and frequency for last 3 days requesting a Rx be sent to pharmacy on file. Scheduled her annual appointment as well. Pt is aware that if she still has symptoms after taking Rx she will need to come in for an appt.

## 2019-07-22 NOTE — TELEPHONE ENCOUNTER
Dr Park pt's  Brayan calling regarding the pt having a UTI all weekend and would like something sent in or be seen. Brayan # [phone]

## 2019-07-22 NOTE — TELEPHONE ENCOUNTER
MD Rocío Mao MA   Caller: Unspecified (Today, 11:58 AM)             Bactrim sent for 3 days      Informed pt that Rx was sent and reminded her that she needs to call to schedule an appt if no improvement after completing the Rx.

## 2019-10-23 ENCOUNTER — TELEPHONE (OUTPATIENT)
Dept: FAMILY MEDICINE | Facility: CLINIC | Age: 69
End: 2019-10-23

## 2019-10-23 NOTE — TELEPHONE ENCOUNTER
----- Message from Aleena Garcia sent at 10/23/2019  9:58 AM CDT -----  Patient's , Brayan, called.  No.[phone]     Patient would like to speak to the nurse before scheduling an appointment.   Patient saw Dr. Poole at [facility].     Please call.

## 2019-10-28 ENCOUNTER — OFFICE VISIT (OUTPATIENT)
Dept: FAMILY MEDICINE | Facility: CLINIC | Age: 69
End: 2019-10-28
Payer: MEDICARE

## 2019-10-28 VITALS
TEMPERATURE: 98 F | BODY MASS INDEX: 17.16 KG/M2 | HEART RATE: 69 BPM | HEIGHT: 62 IN | WEIGHT: 93.25 LBS | DIASTOLIC BLOOD PRESSURE: 62 MMHG | SYSTOLIC BLOOD PRESSURE: 126 MMHG | OXYGEN SATURATION: 93 %

## 2019-10-28 DIAGNOSIS — K21.9 GASTROESOPHAGEAL REFLUX DISEASE WITHOUT ESOPHAGITIS: ICD-10-CM

## 2019-10-28 DIAGNOSIS — G43.009 MIGRAINE WITHOUT AURA AND WITHOUT STATUS MIGRAINOSUS, NOT INTRACTABLE: ICD-10-CM

## 2019-10-28 DIAGNOSIS — R07.9 LEFT-SIDED CHEST PAIN: ICD-10-CM

## 2019-10-28 DIAGNOSIS — M32.9 LUPUS: ICD-10-CM

## 2019-10-28 DIAGNOSIS — G89.29 OTHER CHRONIC PAIN: ICD-10-CM

## 2019-10-28 DIAGNOSIS — F33.9 RECURRENT MAJOR DEPRESSIVE DISORDER, REMISSION STATUS UNSPECIFIED: ICD-10-CM

## 2019-10-28 PROBLEM — J44.9 CHRONIC OBSTRUCTIVE PULMONARY DISEASE: Status: ACTIVE | Noted: 2019-10-28

## 2019-10-28 PROBLEM — Z72.0 TOBACCO USER: Status: ACTIVE | Noted: 2019-10-28

## 2019-10-28 PROBLEM — M79.7 FIBROMYOSITIS: Status: ACTIVE | Noted: 2019-10-28

## 2019-10-28 PROCEDURE — 90662 IIV NO PRSV INCREASED AG IM: CPT | Mod: PBBFAC,PN

## 2019-10-28 PROCEDURE — 99999 PR PBB SHADOW E&M-EST. PATIENT-LVL V: CPT | Mod: PBBFAC,,, | Performed by: INTERNAL MEDICINE

## 2019-10-28 PROCEDURE — 99203 OFFICE O/P NEW LOW 30 MIN: CPT | Mod: S$PBB,,, | Performed by: INTERNAL MEDICINE

## 2019-10-28 PROCEDURE — 99215 OFFICE O/P EST HI 40 MIN: CPT | Mod: PBBFAC,PN | Performed by: INTERNAL MEDICINE

## 2019-10-28 PROCEDURE — 99999 PR PBB SHADOW E&M-EST. PATIENT-LVL V: ICD-10-PCS | Mod: PBBFAC,,, | Performed by: INTERNAL MEDICINE

## 2019-10-28 PROCEDURE — 99203 PR OFFICE/OUTPT VISIT, NEW, LEVL III, 30-44 MIN: ICD-10-PCS | Mod: S$PBB,,, | Performed by: INTERNAL MEDICINE

## 2019-10-28 RX ORDER — BUTALBITAL, ACETAMINOPHEN AND CAFFEINE 50; 325; 40 MG/1; MG/1; MG/1
1 TABLET ORAL DAILY PRN
COMMUNITY
Start: 2019-04-30 | End: 2019-11-15 | Stop reason: SDUPTHER

## 2019-10-28 RX ORDER — PANTOPRAZOLE SODIUM 40 MG/1
1 TABLET, DELAYED RELEASE ORAL DAILY
COMMUNITY
Start: 2019-08-17 | End: 2019-11-15 | Stop reason: SDUPTHER

## 2019-10-28 RX ORDER — TRAZODONE HYDROCHLORIDE 100 MG/1
1 TABLET ORAL NIGHTLY
COMMUNITY
End: 2019-11-15 | Stop reason: SDUPTHER

## 2019-10-28 NOTE — PROGRESS NOTES
[facility] Note    Chief Complaint      Chief Complaint   Patient presents with    Pain     pt c/o having pain all over her body especially left side     History of Present Illness      Virginia Alexandre is a 69 y.o. female who presents today for pain.  Patient comes to appointment with .     Problem List Items Addressed This Visit     Lupus    Current Assessment & Plan     Trying to reschedule with rheumatology Dr. Foster.  On cellcept.           Migraine    Current Assessment & Plan     Sees Dr. Johnson, waiting for shingles lesions to heal so she can try to do Botox injections.  Takes Topamax, Elavil, Fioricet.         Depression    Current Assessment & Plan     On Elavil and Zoloft 50 mg daily.  No SI/HI/panic attacks.         GERD (gastroesophageal reflux disease)    Current Assessment & Plan     Stable on Nexium, no reflux/nausea.         Relevant Orders    CBC auto differential    Comprehensive metabolic panel    Other chronic pain    Current Assessment & Plan     Sees Dr. Wilson, on methadone, percocet, elavil, mobic.         Left-sided chest pain    Current Assessment & Plan     Fell on left side when she tripped in bedroom, tripped over her feet.  No SOB, also has leg pain.  No longer has bruising.  Sees Dr. Wilson for pain management.  Meds help somewhat.               Health Maintenance   Topic Date Due    Hepatitis C Screening  1950    Lipid Panel  1950    DEXA SCAN  03/20/1989    Colonoscopy  03/20/1999    Pneumococcal Vaccine (65+ Low/Medium Risk) (1 of 2 - PCV13) 03/20/2014    TETANUS VACCINE  12/16/2020       Past Medical History:   Diagnosis Date    Breast cancer 1983    bilateral mastectomy, chemo, XRT    Depression     GERD (gastroesophageal reflux disease)     Lupus     Migraine     Shingles        Past Surgical History:   Procedure Laterality Date    APPENDECTOMY  1965    AUGMENTATION OF BREAST      BRAIN SURGERY  1980's     x4 for trigeminal neuralgia    BREAST BIOPSY      BREAST RECONSTRUCTION      BREAST SURGERY      - silicone implants replaced x 2 due to leak    CHOLECYSTECTOMY  1979    MASTECTOMY Bilateral 1983    TONSILLECTOMY, ADENOIDECTOMY  1950       family history includes Diabetes in her mother; Miscarriages / Stillbirths in her mother; Throat cancer in her brother.    Social History     Tobacco Use    Smoking status: Current Every Day Smoker     Packs/day: 0.50     Types: Cigarettes    Smokeless tobacco: Never Used   Substance Use Topics    Alcohol use: No    Drug use: Not on file       Review of Systems   Constitutional: Negative for chills and fever.   HENT: Negative for congestion and sore throat.    Eyes: Negative for blurred vision and discharge.   Respiratory: Negative for cough and shortness of breath.    Cardiovascular: Negative for chest pain and palpitations.   Gastrointestinal: Negative for constipation, diarrhea, nausea and vomiting.   Genitourinary: Negative for dysuria and hematuria.   Musculoskeletal: Negative for falls and myalgias.   Skin: Negative for itching and rash.   Neurological: Negative for dizziness and headaches.        Outpatient Encounter Medications as of 10/28/2019   Medication Sig Note Dispense Refill    butalbital-acetaminophen-caffeine -40 mg (FIORICET, ESGIC) -40 mg per tablet Take 1 tablet by mouth daily as needed.       pantoprazole (PROTONIX) 40 MG tablet Take 1 tablet by mouth once daily.       albuterol (PROVENTIL) 2.5 mg /3 mL (0.083 %) nebulizer solution albuterol sulfate 2.5 mg/3 mL (0.083 %) solution for nebulization       amitriptyline (ELAVIL) 50 MG tablet Take 50 mg by mouth every evening.       cycloSPORINE (RESTASIS) 0.05 % ophthalmic emulsion Restasis 0.05 % eye drops in a dropperette       doxepin (SINEQUAN) 10 MG capsule doxepin 10 mg capsule       fluconazole (DIFLUCAN) 100 MG tablet fluconazole 100 mg tablet   TAKE 1 TABLET(S) BY MOUTH DAILY        furosemide (LASIX) 40 MG tablet        KLOR-CON M10 10 mEq tablet        lidocaine HCl 2% (LIDOCAINE VISCOUS) 2 % Soln Lidocaine Viscous 2 % mucosal solution   SWISH AND SPIT 15 milliliters by mouth TWO TO THREE TIMES A DAY       meloxicam (MOBIC) 15 MG tablet take 1 tablet by mouth once daily if needed for pain   0    methadone (DOLOPHINE) 10 MG tablet methadone 10 mg tablet   TAKE 1/2 TABLETS BY MOUTH EVERY 12 HOURS AS NEEDED FOR PAIN       montelukast (SINGULAIR) 10 mg tablet Take 10 mg by mouth every evening.       mycophenolate (CELLCEPT) 500 mg Tab mycophenolate mofetil 500 mg tablet       nystatin (MYCOSTATIN) 100,000 unit/mL suspension nystatin 100,000 unit/mL oral suspension   TAKE 5 MLS 3 TIMES A DAY AS NEEDED FOR THRUSH       OXYCODONE HCL/ACETAMINOPHEN (PERCOCET ORAL) Take  mg by mouth 3 (three) times daily. 4/20/2016: Received from: [facility] Received Sig: three times a day      pramipexole (MIRAPEX) 0.25 MG tablet Take 0.25 mg by mouth once daily. 4/20/2016: Received from: [facility] Received Sig: once a day      sertraline (ZOLOFT) 50 MG tablet sertraline 50 mg tablet       tiZANidine (ZANAFLEX) 2 MG tablet        topiramate (TOPAMAX) 25 MG tablet        traZODone (DESYREL) 100 MG tablet Take 1 tablet by mouth every evening.       [DISCONTINUED] AMITIZA 24 mcg Cap TK ONE C PO BID   1    [DISCONTINUED] benzonatate (TESSALON) 100 MG capsule benzonatate 100 mg capsule   TAKE ONE CAPSULE BY MOUTH 3 TIMES A DAY AS NEEDED COUGH       [DISCONTINUED] butalbital-acetaminop-caf-cod -95-30 mg Cap butalbital 50 mg-acetaminophen 325 mg-caffeine 40 mg-codeine 30 mg cap   take 1 capsule by mouth once daily       [DISCONTINUED] butalbital-acetaminophen-caffeine -40 mg (FIORICET, ESGIC) -40 mg per tablet        [DISCONTINUED] cephALEXin (KEFLEX) 500 MG capsule         [DISCONTINUED] CIPRODEX 0.3-0.1 % DrpS        [DISCONTINUED] ciprofloxacin HCl (CIPRO) 500 MG tablet ciprofloxacin 500 mg tablet       [DISCONTINUED] ciprofloxacin-dexamethasone 0.3-0.1% (CIPRODEX) 0.3-0.1 % DrpS Ciprodex 0.3 %-0.1 % ear drops,suspension   instill 3 drops into left ear three times a day       [DISCONTINUED] clindamycin (CLEOCIN) 150 MG capsule clindamycin HCl 150 mg capsule   TAKE ONE CAPSULE BY MOUTH EVERY 6 HOURS FOR INFECTION       [DISCONTINUED] clobetasol (TEMOVATE) 0.05 % cream clobetasol 0.05 % topical cream       [DISCONTINUED] clonazePAM (KLONOPIN) 1 MG tablet clonazepam 1 mg tablet   TAKE 1 TABLET EVERY DAY       [DISCONTINUED] clotrimazole-betamethasone 1-0.05% (LOTRISONE) cream clotrimazole-betamethasone 1 %-0.05 % topical cream   APPLY TO AFFECTED AREA 2 TIMES A DAY FOR 2 WEEKS       [DISCONTINUED] codeine-butalbital-ASA-caffeine (BUTALBITAL COMPOUND W/CODEINE) 17--40 mg Cap Butalbital Compound with Codeine 30 mg-50 mg-325 mg-40 mg capsule   take 1 capsule by mouth once daily       [DISCONTINUED] cyproheptadine (PERIACTIN) 4 mg tablet cyproheptadine 4 mg tablet       [DISCONTINUED] cyproheptadine (PERIACTIN) 4 mg tablet        [DISCONTINUED] desonide (DESOWEN) 0.05 % cream desonide 0.05 % topical cream   APPLY TO AFFECTED AREA OF NOSE 2 TO 3 TIMES A DAY WITH Q-TIP       [DISCONTINUED] doxycycline (VIBRAMYCIN) 100 MG Cap doxycycline hyclate 100 mg capsule   TAKE ONE CAPSULE BY MOUTH TWICE DAILY       [DISCONTINUED] erythromycin (ROMYCIN) ophthalmic ointment erythromycin 5 mg/gram (0.5 %) eye ointment   1 APPLICATION IN BOTH EYES AT BEDTIME       [DISCONTINUED] esomeprazole (NEXIUM) 40 MG capsule Take 40 mg by mouth once daily. 4/20/2016: Received from: [facility] Received Sig: once a day      [DISCONTINUED] fluocinonide (LIDEX) 0.05 % external solution fluocinonide 0.05 % topical solution   APPLY TO SCALP TWICE A DAY        [DISCONTINUED] fluorometholone 0.1% (FML) 0.1 % DrpS fluorometholone 0.1 % eye drops,suspension       [DISCONTINUED] fluticasone (FLONASE) 50 mcg/actuation nasal spray fluticasone 50 mcg/actuation nasal spray,suspension   INSTILL 2 SPRAYS IN EACH NOSTRIL DAILY       [DISCONTINUED] frovatriptan (FROVA) 2.5 MG tablet Frova 2.5 mg tablet   1 tablet po at onset of headache.  May take 2nd tablet po 2 hours or more later in the day.  Maximum dose is 2 tablets per 24 hours.       [DISCONTINUED] gabapentin (NEURONTIN) 100 MG capsule gabapentin 100 mg capsule   TAKE ONE CAPSULE BY MOUTH 3 TIMES A DAY       [DISCONTINUED] gentamicin (GENTAK) 0.3 % (3 mg/gram) ophthalmic ointment Gentak 0.3 % (3 mg/gram) eye ointment   1 APPLICATION IN BOTH EYES AT BEDTIME       [DISCONTINUED] HYDROcodone-acetaminophen (NORCO)  mg per tablet hydrocodone 10 mg-acetaminophen 325 mg tablet   TAKE 1 TABLET 4 TIMES DAILY       [DISCONTINUED] HYDROcodone-acetaminophen (NORCO) 7.5-325 mg per tablet hydrocodone 7.5 mg-acetaminophen 325 mg tablet   TAKE 1 TABLET BY MOUTH EVERY 6 TO 8 HOURS AS NEEDED FOR PAIN       [DISCONTINUED] hydrocortisone (PROCTOSOL HC) 2.5 % rectal cream Proctosol HC 2.5 % rectal cream with applicator   APPLY RECTALLY AS NEEDED       [DISCONTINUED] hydrocortisone 2.5 % cream hydrocortisone 2.5 % topical cream   APPLY TO AFFECTED AREA TWICE A DAY       [DISCONTINUED] hydrocortisone butyrate 0.1 % Soln hydrocortisone butyrate 0.1 % topical solution       [DISCONTINUED] hydrocortisone-pramoxine (PRAMOSONE) 2.5-1 % Lotn lotion Pramosone 2.5 %-1 % lotion   APPLY TO RASH 3 TIMES A DAY       [DISCONTINUED] HYDROXYZINE PAMOATE (VISTARIL ORAL) once a day 4/20/2016: Received from: [facility]      [DISCONTINUED] ketoconazole (NIZORAL) 2 % shampoo ketoconazole 2 % shampoo   WASH THREE TIMES A WEEK, LET SIT FOR 5 MINUTES, THEN RINSE AFTER       [DISCONTINUED] levoFLOXacin  (LEVAQUIN) 500 MG tablet levofloxacin 500 mg tablet   TAKE 1 TABLET EVERY DAY (WITH FOOD)       [DISCONTINUED] levoFLOXacin (LEVAQUIN) 750 MG tablet levofloxacin 750 mg tablet   take 1 tablet by mouth once daily       [DISCONTINUED] LINACLOTIDE (LINZESS ORAL) Take 290 mcg by mouth once daily. 2016: Received from: [facility] Received Si every morning      [DISCONTINUED] methadone (DOLOPHINE) 5 MG tablet methadone 5 mg tablet       [DISCONTINUED] mirabegron (MYRBETRIQ) 25 mg Tb24 ER tablet Myrbetriq 25 mg tablet,extended release   TAKE 1 TABLET BY MOUTH DAILY       [DISCONTINUED] mometasone (ELOCON) 0.1 % ointment        [DISCONTINUED] mometasone (ELOCON) 0.1 % ointment mometasone 0.1 % topical ointment       [DISCONTINUED] mupirocin (BACTROBAN) 2 % ointment apply to LESION once daily   0    [DISCONTINUED] mupirocin (BACTROBAN) 2 % ointment mupirocin 2 % topical ointment       [DISCONTINUED] omeprazole-sodium bicarbonate (ZEGERID) 40-1.1 mg-gram per capsule Take 1 capsule by mouth before breakfast.       [DISCONTINUED] ondansetron (ZOFRAN) 4 MG tablet ondansetron HCl 4 mg tablet       [DISCONTINUED] OXYCONTIN 20 mg 12 hr tablet    0    [DISCONTINUED] sulfacetamide-prednisoLONE 10%-0.23%, 0.25%, (VASOCIDIN) 10 %-0.23 % (0.25 %) Drop sulfacetamide-prednisolone 10 %-0.23 % (0.25 %) eye drops   INSTILL 3 DROPS LEFT EAR 3 TIMES A DAY       [DISCONTINUED] sumatriptan (IMITREX STATDOSE PEN) 6 mg/0.5 mL kit Imitrex STATdose Pen 6 mg/0.5 mL subcutaneous pen injector       [DISCONTINUED] tobramycin sulfate 0.3% (TOBREX) 0.3 % ophthalmic ointment Tobrex 0.3 % eye ointment   APPLY 1 APPLICATION IN BOTH EYES AT BEDTIME       [DISCONTINUED] zoster vaccine live, PF, (ZOSTAVAX, PF,) 19,400 unit/0.65 mL injection Zostavax (PF) 19,400 unit/0.65 mL subcutaneous suspension   TO BE ADMINISTERED BY PHARMACIST FOR IMMUNIZATION        No facility-administered encounter  "medications on file as of 10/28/2019.         Review of patient's allergies indicates:   Allergen Reactions    Codeine Itching    Adhesive     Adhesive tape-silicones     Doxycycline     Latex     Methotrexate analogues     Sulfa (sulfonamide antibiotics)     Toradol [ketorolac]     Gabapentin Rash       Physical Exam      Vital Signs  Temp: 98.1 °F (36.7 °C)  Temp src: Oral  Pulse: 69  SpO2: (!) 93 %  BP: 126/62  BP Location: Left arm  Patient Position: Sitting  Pain Score:   9  Height and Weight  Height: 5' 2" (157.5 cm)  Weight: 42.3 kg (93 lb 4.1 oz)  BSA (Calculated - sq m): 1.36 sq meters  BMI (Calculated): 17.1  Weight in (lb) to have BMI = 25: 136.4]    Physical Exam   Constitutional: She is oriented to person, place, and time. She appears well-developed and well-nourished.   HENT:   Head: Normocephalic and atraumatic.   Right Ear: External ear normal.   Left Ear: External ear normal.   Eyes: Right eye exhibits no discharge. Left eye exhibits no discharge.   Neck: Normal range of motion. No thyromegaly present.   Cardiovascular: Normal rate, regular rhythm, normal heart sounds and intact distal pulses.   No murmur heard.  Pulmonary/Chest: Effort normal and breath sounds normal. No respiratory distress.   Abdominal: Soft. Bowel sounds are normal. She exhibits no distension. There is no tenderness.   Musculoskeletal: Normal range of motion. She exhibits no deformity.   Neurological: She is alert and oriented to person, place, and time.   Skin: Skin is warm and dry. No rash noted.   Psychiatric: She has a normal mood and affect. Her behavior is normal.        Laboratory:  CBC:  No results for input(s): WBC, RBC, HGB, HCT, PLT, MCV, MCH, MCHC in the last 2160 hours.  CMP:  No results for input(s): GLU, CALCIUM, ALBUMIN, PROT, NA, K, CO2, CL, BUN, ALKPHOS, ALT, AST, BILITOT in the last 2160 hours.    Invalid input(s): CREATININ  URINALYSIS:  No results for input(s): COLORU, CLARITYU, SPECGRAV, PHUR, " PROTEINUA, GLUCOSEU, BILIRUBINCON, BLOODU, WBCU, RBCU, BACTERIA, MUCUS, NITRITE, LEUKOCYTESUR, UROBILINOGEN, HYALINECASTS in the last 2160 hours.   LIPIDS:  No results for input(s): TSH, HDL, CHOL, TRIG, LDLCALC, CHOLHDL, NONHDLCHOL, TOTALCHOLEST in the last 2160 hours.  TSH:  No results for input(s): TSH in the last 2160 hours.  A1C:  No results for input(s): HGBA1C in the last 2160 hours.    Radiology:  No new imaging    Assessment/Plan     Virginia Alexandre is a 69 y.o.female with:    1. Recurrent major depressive disorder, remission status unspecified    2. Gastroesophageal reflux disease without esophagitis  - CBC auto differential; Future  - Comprehensive metabolic panel; Future  - CBC auto differential  - Comprehensive metabolic panel    3. Left-sided chest pain    4. Migraine without aura and without status migrainosus, not intractable    5. Lupus    6. Other chronic pain    -flu shot today  -Continue pain meds as per pain management  -Continue current medications and maintain follow up with specialists.  Return to clinic in 6 months.      Sofiya Poole MD  [facility]

## 2019-10-28 NOTE — ASSESSMENT & PLAN NOTE
Fell on left side when she tripped in bedroom, tripped over her feet.  No SOB, also has leg pain.  No longer has bruising.  Sees Dr. Wilson for pain management.  Meds help somewhat.

## 2019-10-28 NOTE — ASSESSMENT & PLAN NOTE
Sees Dr. Johnson, waiting for shingles lesions to heal so she can try to do Botox injections.  Takes Topamax, Elavil, Fioricet.

## 2019-11-15 RX ORDER — ONDANSETRON 4 MG/1
4 TABLET, FILM COATED ORAL EVERY 8 HOURS PRN
COMMUNITY
End: 2019-11-15 | Stop reason: SDUPTHER

## 2019-11-15 RX ORDER — PHENAZOPYRIDINE HYDROCHLORIDE 100 MG/1
100 TABLET, FILM COATED ORAL
COMMUNITY
End: 2019-11-15 | Stop reason: SDUPTHER

## 2019-11-15 RX ORDER — METOPROLOL TARTRATE 25 MG/1
25 TABLET, FILM COATED ORAL 2 TIMES DAILY
COMMUNITY
End: 2019-11-15 | Stop reason: SDUPTHER

## 2019-11-18 RX ORDER — TOPIRAMATE 25 MG/1
25 TABLET ORAL DAILY
Qty: 90 TABLET | Refills: 3 | Status: SHIPPED | OUTPATIENT
Start: 2019-11-18 | End: 2019-11-20 | Stop reason: SDUPTHER

## 2019-11-18 RX ORDER — PANTOPRAZOLE SODIUM 40 MG/1
40 TABLET, DELAYED RELEASE ORAL DAILY
Qty: 90 TABLET | Refills: 3 | Status: SHIPPED | OUTPATIENT
Start: 2019-11-18 | End: 2019-11-20 | Stop reason: SDUPTHER

## 2019-11-18 RX ORDER — SERTRALINE HYDROCHLORIDE 50 MG/1
50 TABLET, FILM COATED ORAL DAILY
Qty: 90 TABLET | Refills: 3 | Status: SHIPPED | OUTPATIENT
Start: 2019-11-18 | End: 2019-11-20 | Stop reason: SDUPTHER

## 2019-11-18 RX ORDER — ALBUTEROL SULFATE 0.83 MG/ML
2.5 SOLUTION RESPIRATORY (INHALATION) EVERY 4 HOURS PRN
Qty: 90 EACH | Refills: 5 | Status: SHIPPED | OUTPATIENT
Start: 2019-11-18 | End: 2019-11-20 | Stop reason: SDUPTHER

## 2019-11-18 RX ORDER — FUROSEMIDE 40 MG/1
20 TABLET ORAL DAILY
Qty: 45 TABLET | Refills: 3 | Status: SHIPPED | OUTPATIENT
Start: 2019-11-18 | End: 2019-11-20 | Stop reason: SDUPTHER

## 2019-11-18 RX ORDER — FLUCONAZOLE 100 MG/1
100 TABLET ORAL DAILY
Qty: 90 TABLET | Refills: 1 | Status: SHIPPED | OUTPATIENT
Start: 2019-11-18 | End: 2019-11-20 | Stop reason: SDUPTHER

## 2019-11-18 RX ORDER — NYSTATIN 100000 [USP'U]/ML
5 SUSPENSION ORAL
Qty: 473 ML | Refills: 1 | Status: SHIPPED | OUTPATIENT
Start: 2019-11-18 | End: 2019-11-20 | Stop reason: SDUPTHER

## 2019-11-18 RX ORDER — METHADONE HYDROCHLORIDE 10 MG/1
TABLET ORAL
OUTPATIENT
Start: 2019-11-18

## 2019-11-18 RX ORDER — TRAZODONE HYDROCHLORIDE 100 MG/1
100 TABLET ORAL NIGHTLY
Qty: 90 TABLET | Refills: 3 | Status: SHIPPED | OUTPATIENT
Start: 2019-11-18 | End: 2019-11-20 | Stop reason: SDUPTHER

## 2019-11-18 RX ORDER — PRAMIPEXOLE DIHYDROCHLORIDE 0.25 MG/1
0.25 TABLET ORAL DAILY
Qty: 90 TABLET | Refills: 3 | Status: SHIPPED | OUTPATIENT
Start: 2019-11-18 | End: 2019-11-20 | Stop reason: SDUPTHER

## 2019-11-18 RX ORDER — ONDANSETRON 4 MG/1
4 TABLET, FILM COATED ORAL EVERY 8 HOURS PRN
Qty: 90 TABLET | Refills: 3 | Status: SHIPPED | OUTPATIENT
Start: 2019-11-18 | End: 2019-11-20 | Stop reason: SDUPTHER

## 2019-11-18 RX ORDER — CYCLOSPORINE 0.5 MG/ML
EMULSION OPHTHALMIC
Qty: 60 EACH | Refills: 5 | Status: SHIPPED | OUTPATIENT
Start: 2019-11-18 | End: 2019-11-20 | Stop reason: SDUPTHER

## 2019-11-18 RX ORDER — MELOXICAM 15 MG/1
TABLET ORAL
Qty: 90 TABLET | Refills: 1 | Status: SHIPPED | OUTPATIENT
Start: 2019-11-18 | End: 2019-11-20 | Stop reason: SDUPTHER

## 2019-11-18 RX ORDER — TIZANIDINE 2 MG/1
2 TABLET ORAL EVERY 8 HOURS PRN
Qty: 90 TABLET | Refills: 1 | Status: SHIPPED | OUTPATIENT
Start: 2019-11-18 | End: 2019-11-20 | Stop reason: SDUPTHER

## 2019-11-18 RX ORDER — METOPROLOL TARTRATE 25 MG/1
25 TABLET, FILM COATED ORAL 2 TIMES DAILY
Qty: 180 TABLET | Refills: 3 | Status: SHIPPED | OUTPATIENT
Start: 2019-11-18 | End: 2019-11-20 | Stop reason: SDUPTHER

## 2019-11-18 RX ORDER — AMITRIPTYLINE HYDROCHLORIDE 50 MG/1
50 TABLET, FILM COATED ORAL NIGHTLY
Qty: 90 TABLET | Refills: 3 | Status: SHIPPED | OUTPATIENT
Start: 2019-11-18 | End: 2019-11-20 | Stop reason: SDUPTHER

## 2019-11-18 RX ORDER — PHENAZOPYRIDINE HYDROCHLORIDE 100 MG/1
100 TABLET, FILM COATED ORAL
Qty: 270 TABLET | Refills: 3 | Status: SHIPPED | OUTPATIENT
Start: 2019-11-18 | End: 2019-11-20 | Stop reason: SDUPTHER

## 2019-11-18 RX ORDER — MONTELUKAST SODIUM 10 MG/1
10 TABLET ORAL NIGHTLY
Qty: 90 TABLET | Refills: 3 | Status: SHIPPED | OUTPATIENT
Start: 2019-11-18 | End: 2019-11-20 | Stop reason: SDUPTHER

## 2019-11-18 RX ORDER — BUTALBITAL, ACETAMINOPHEN AND CAFFEINE 50; 325; 40 MG/1; MG/1; MG/1
1 TABLET ORAL DAILY PRN
Qty: 90 TABLET | Refills: 0 | Status: SHIPPED | OUTPATIENT
Start: 2019-11-18 | End: 2019-11-20 | Stop reason: SDUPTHER

## 2019-11-20 RX ORDER — MONTELUKAST SODIUM 10 MG/1
10 TABLET ORAL NIGHTLY
Qty: 90 TABLET | Refills: 3 | Status: SHIPPED | OUTPATIENT
Start: 2019-11-20

## 2019-11-20 RX ORDER — AMITRIPTYLINE HYDROCHLORIDE 50 MG/1
50 TABLET, FILM COATED ORAL NIGHTLY
Qty: 90 TABLET | Refills: 3 | Status: SHIPPED | OUTPATIENT
Start: 2019-11-20

## 2019-11-20 RX ORDER — TRAZODONE HYDROCHLORIDE 100 MG/1
100 TABLET ORAL NIGHTLY
Qty: 90 TABLET | Refills: 3 | Status: SHIPPED | OUTPATIENT
Start: 2019-11-20

## 2019-11-20 RX ORDER — MELOXICAM 15 MG/1
TABLET ORAL
Qty: 90 TABLET | Refills: 1 | Status: SHIPPED | OUTPATIENT
Start: 2019-11-20

## 2019-11-20 RX ORDER — TOPIRAMATE 25 MG/1
25 TABLET ORAL DAILY
Qty: 90 TABLET | Refills: 3 | Status: SHIPPED | OUTPATIENT
Start: 2019-11-20

## 2019-11-20 RX ORDER — PHENAZOPYRIDINE HYDROCHLORIDE 100 MG/1
100 TABLET, FILM COATED ORAL
Qty: 270 TABLET | Refills: 3 | Status: SHIPPED | OUTPATIENT
Start: 2019-11-20 | End: 2020-01-07

## 2019-11-20 RX ORDER — SERTRALINE HYDROCHLORIDE 50 MG/1
50 TABLET, FILM COATED ORAL DAILY
Qty: 90 TABLET | Refills: 3 | Status: SHIPPED | OUTPATIENT
Start: 2019-11-20

## 2019-11-20 RX ORDER — FUROSEMIDE 40 MG/1
20 TABLET ORAL DAILY
Qty: 45 TABLET | Refills: 3 | Status: SHIPPED | OUTPATIENT
Start: 2019-11-20

## 2019-11-20 RX ORDER — CYCLOSPORINE 0.5 MG/ML
EMULSION OPHTHALMIC
Qty: 60 EACH | Refills: 5 | Status: SHIPPED | OUTPATIENT
Start: 2019-11-20 | End: 2019-11-27

## 2019-11-20 RX ORDER — NYSTATIN 100000 [USP'U]/ML
5 SUSPENSION ORAL
Qty: 473 ML | Refills: 1 | Status: SHIPPED | OUTPATIENT
Start: 2019-11-20

## 2019-11-20 RX ORDER — ONDANSETRON 4 MG/1
4 TABLET, FILM COATED ORAL EVERY 8 HOURS PRN
Qty: 90 TABLET | Refills: 3 | Status: SHIPPED | OUTPATIENT
Start: 2019-11-20

## 2019-11-20 RX ORDER — PRAMIPEXOLE DIHYDROCHLORIDE 0.25 MG/1
0.25 TABLET ORAL DAILY
Qty: 90 TABLET | Refills: 3 | Status: SHIPPED | OUTPATIENT
Start: 2019-11-20

## 2019-11-20 RX ORDER — FLUCONAZOLE 100 MG/1
100 TABLET ORAL DAILY
Qty: 90 TABLET | Refills: 1 | Status: SHIPPED | OUTPATIENT
Start: 2019-11-20 | End: 2019-12-02 | Stop reason: SDUPTHER

## 2019-11-20 RX ORDER — PANTOPRAZOLE SODIUM 40 MG/1
40 TABLET, DELAYED RELEASE ORAL DAILY
Qty: 90 TABLET | Refills: 3 | Status: SHIPPED | OUTPATIENT
Start: 2019-11-20

## 2019-11-20 RX ORDER — TIZANIDINE 2 MG/1
2 TABLET ORAL EVERY 8 HOURS PRN
Qty: 90 TABLET | Refills: 1 | Status: SHIPPED | OUTPATIENT
Start: 2019-11-20

## 2019-11-20 RX ORDER — BUTALBITAL, ACETAMINOPHEN AND CAFFEINE 50; 325; 40 MG/1; MG/1; MG/1
1 TABLET ORAL DAILY PRN
Qty: 90 TABLET | Refills: 0 | Status: SHIPPED | OUTPATIENT
Start: 2019-11-20

## 2019-11-20 RX ORDER — METOPROLOL TARTRATE 25 MG/1
25 TABLET, FILM COATED ORAL 2 TIMES DAILY
Qty: 180 TABLET | Refills: 3 | Status: SHIPPED | OUTPATIENT
Start: 2019-11-20 | End: 2020-01-29

## 2019-11-20 RX ORDER — ALBUTEROL SULFATE 0.83 MG/ML
2.5 SOLUTION RESPIRATORY (INHALATION) EVERY 4 HOURS PRN
Qty: 90 EACH | Refills: 5 | Status: SHIPPED | OUTPATIENT
Start: 2019-11-20

## 2019-11-26 ENCOUNTER — TELEPHONE (OUTPATIENT)
Dept: FAMILY MEDICINE | Facility: CLINIC | Age: 69
End: 2019-11-26

## 2019-11-26 NOTE — TELEPHONE ENCOUNTER
----- Message from Chaya Mayen sent at 11/26/2019  8:47 AM CST -----  Contact: [place]([facility])  Calling regarding new prescription Respasis VL 0.4 ml 30 .05%    Please advise, can be reached at [phone] reference # [id number]

## 2019-11-26 NOTE — TELEPHONE ENCOUNTER
----- Message from Lacey Vail sent at 11/26/2019  1:43 PM CST -----  Contact: self  Patient is returning your call.

## 2019-11-26 NOTE — TELEPHONE ENCOUNTER
Spoke to chepe at pharmacy, need to clarify directions on the eye drops. Doesn't look like we have the directions in her chart. lmovm for pt to try to get directions on how she uses it. This is day 6 of the pharmacy having her rx, on day 7 the pharmacy will cancel her rx.

## 2019-11-27 RX ORDER — CYCLOSPORINE 0.5 MG/ML
1 EMULSION OPHTHALMIC 4 TIMES DAILY
Qty: 60 EACH | Refills: 5 | Status: SHIPPED | OUTPATIENT
Start: 2019-11-27

## 2019-11-27 NOTE — TELEPHONE ENCOUNTER
I don't want to send an antibiotic without examining her given her issues with resistant ear infections in the past. Most of there URI's are viral anyway.    Can do mucinex, antihistamine like zyrtec/claritin, and Flonase to help with congestion.

## 2019-11-27 NOTE — TELEPHONE ENCOUNTER
The medication is Restasis she uses it 1 drop each eye four times a day, She is also having URI-eyes, throat, head, ears clogged(brown wax)  has work and can not bring her. Can we call out meds? Also made appt for Monday to be checked out.

## 2019-11-29 ENCOUNTER — TELEPHONE (OUTPATIENT)
Dept: FAMILY MEDICINE | Facility: CLINIC | Age: 69
End: 2019-11-29

## 2019-11-29 DIAGNOSIS — Z12.11 COLON CANCER SCREENING: ICD-10-CM

## 2019-11-29 NOTE — TELEPHONE ENCOUNTER
----- Message from Sally Gonzalez sent at 11/29/2019 10:53 AM CST -----  Contact: [phone]/[place]  Type:  Pharmacy Calling to Clarify an RX    Name of Caller:   Pharmacy Name: [id number] - [place], [place] - [address] AT [facility]  Prescription Name: cycloSPORINE (RESTASIS) 0.05 % ophthalmic emulsion  What do they need to clarify?: 4 times a day is not the normal dosing. It is usually 2 times a day  Best Call Back Number:   Additional Information:  Please clarify

## 2019-11-29 NOTE — TELEPHONE ENCOUNTER
Pharmacy calling to clarify if Dr. Poole directions on pt restasis is correct. Do you want the pt to do restasis 4x daily?

## 2019-11-29 NOTE — TELEPHONE ENCOUNTER
Spoke with pharmacy in regards to clarification of restasis medication. Informed pharmacy that according to , that's how the pt take the medication. Pharmacy verbalized understanding.

## 2019-12-02 ENCOUNTER — OFFICE VISIT (OUTPATIENT)
Dept: FAMILY MEDICINE | Facility: CLINIC | Age: 69
End: 2019-12-02
Payer: MEDICARE

## 2019-12-02 VITALS
TEMPERATURE: 98 F | HEART RATE: 104 BPM | WEIGHT: 101.31 LBS | BODY MASS INDEX: 18.53 KG/M2 | DIASTOLIC BLOOD PRESSURE: 64 MMHG | SYSTOLIC BLOOD PRESSURE: 114 MMHG | OXYGEN SATURATION: 97 %

## 2019-12-02 DIAGNOSIS — M32.9 LUPUS: ICD-10-CM

## 2019-12-02 DIAGNOSIS — G43.009 MIGRAINE WITHOUT AURA AND WITHOUT STATUS MIGRAINOSUS, NOT INTRACTABLE: Primary | ICD-10-CM

## 2019-12-02 PROCEDURE — 99214 PR OFFICE/OUTPT VISIT, EST, LEVL IV, 30-39 MIN: ICD-10-PCS | Mod: S$PBB,,, | Performed by: INTERNAL MEDICINE

## 2019-12-02 PROCEDURE — 99999 PR PBB SHADOW E&M-EST. PATIENT-LVL IV: CPT | Mod: PBBFAC,,, | Performed by: INTERNAL MEDICINE

## 2019-12-02 PROCEDURE — 99214 OFFICE O/P EST MOD 30 MIN: CPT | Mod: PBBFAC,PN | Performed by: INTERNAL MEDICINE

## 2019-12-02 PROCEDURE — 1159F PR MEDICATION LIST DOCUMENTED IN MEDICAL RECORD: ICD-10-PCS | Mod: ,,, | Performed by: INTERNAL MEDICINE

## 2019-12-02 PROCEDURE — 1125F AMNT PAIN NOTED PAIN PRSNT: CPT | Mod: ,,, | Performed by: INTERNAL MEDICINE

## 2019-12-02 PROCEDURE — 99214 OFFICE O/P EST MOD 30 MIN: CPT | Mod: S$PBB,,, | Performed by: INTERNAL MEDICINE

## 2019-12-02 PROCEDURE — 1159F MED LIST DOCD IN RCRD: CPT | Mod: ,,, | Performed by: INTERNAL MEDICINE

## 2019-12-02 PROCEDURE — 1125F PR PAIN SEVERITY QUANTIFIED, PAIN PRESENT: ICD-10-PCS | Mod: ,,, | Performed by: INTERNAL MEDICINE

## 2019-12-02 PROCEDURE — 99999 PR PBB SHADOW E&M-EST. PATIENT-LVL IV: ICD-10-PCS | Mod: PBBFAC,,, | Performed by: INTERNAL MEDICINE

## 2019-12-02 RX ORDER — ESZOPICLONE 3 MG/1
3 TABLET, FILM COATED ORAL NIGHTLY
Qty: 30 TABLET | Refills: 0 | Status: SHIPPED | OUTPATIENT
Start: 2019-12-02 | End: 2020-01-01

## 2019-12-02 RX ORDER — FLUCONAZOLE 100 MG/1
100 TABLET ORAL DAILY
Qty: 30 TABLET | Refills: 0 | Status: SHIPPED | OUTPATIENT
Start: 2019-12-02 | End: 2020-01-29 | Stop reason: SDUPTHER

## 2019-12-02 NOTE — PROGRESS NOTES
[facility] Note    Chief Complaint      Chief Complaint   Patient presents with    Medication Refill     History of Present Illness      Virginia Alexandre is a 69 y.o. female who presents today for medication refill.  Patient comes to appointment with .     Problem List Items Addressed This Visit     Lupus    Current Assessment & Plan     Will be seeing Dr. Cardona to establish care soon.  On cellcept.           Migraine    Current Assessment & Plan     Sees Dr. Johnson, waiting for shingles lesions to heal so she can try to do Botox injections.  Headaches have been really bad since.  Takes Topamax, Elavil, Fioricet.               Health Maintenance   Topic Date Due    Hepatitis C Screening  1950    Lipid Panel  1950    DEXA SCAN  03/20/1989    Colonoscopy  03/20/1999    Pneumococcal Vaccine (65+ Low/Medium Risk) (1 of 2 - PCV13) 03/20/2014    TETANUS VACCINE  12/16/2020       Past Medical History:   Diagnosis Date    Breast cancer 1983    bilateral mastectomy, chemo, XRT    Depression     GERD (gastroesophageal reflux disease)     Lupus     Migraine     Shingles        Past Surgical History:   Procedure Laterality Date    APPENDECTOMY  1965    AUGMENTATION OF BREAST      BRAIN SURGERY  1980's    x4 for trigeminal neuralgia    BREAST BIOPSY      BREAST RECONSTRUCTION      BREAST SURGERY      - silicone implants replaced x 2 due to leak    CHOLECYSTECTOMY  1979    MASTECTOMY Bilateral 1983    TONSILLECTOMY, ADENOIDECTOMY  1950       family history includes Diabetes in her mother; Miscarriages / Stillbirths in her mother; Throat cancer in her brother.    Social History     Tobacco Use    Smoking status: Current Every Day Smoker     Packs/day: 0.50     Types: Cigarettes    Smokeless tobacco: Never Used   Substance Use Topics    Alcohol use: No    Drug use: Not on file       Review of Systems   Constitutional: Negative for chills and  fever.   HENT: Negative for congestion and sore throat.    Eyes: Negative for blurred vision and discharge.   Respiratory: Negative for cough and shortness of breath.    Cardiovascular: Negative for chest pain and palpitations.   Gastrointestinal: Negative for constipation, diarrhea, nausea and vomiting.   Genitourinary: Negative for dysuria and hematuria.   Musculoskeletal: Negative for falls and myalgias.   Skin: Negative for itching and rash.   Neurological: Negative for dizziness and headaches.        Outpatient Encounter Medications as of 12/2/2019   Medication Sig Note Dispense Refill    albuterol (PROVENTIL) 2.5 mg /3 mL (0.083 %) nebulizer solution Take 3 mLs (2.5 mg total) by nebulization every 4 (four) hours as needed for Wheezing. Rescue  90 each 5    amitriptyline (ELAVIL) 50 MG tablet Take 1 tablet (50 mg total) by mouth every evening.  90 tablet 3    butalbital-acetaminophen-caffeine -40 mg (FIORICET, ESGIC) -40 mg per tablet Take 1 tablet by mouth daily as needed.  90 tablet 0    cycloSPORINE (RESTASIS) 0.05 % ophthalmic emulsion Place 0.4 mLs (1 drop total) into both eyes 4 (four) times daily. Restasis 0.05 % eye drops in a dropperette  60 each 5    fluconazole (DIFLUCAN) 100 MG tablet Take 1 tablet (100 mg total) by mouth once daily.  30 tablet 0    furosemide (LASIX) 40 MG tablet Take 0.5 tablets (20 mg total) by mouth once daily.  45 tablet 3    KLOR-CON M10 10 mEq tablet        lidocaine HCl 2% (LIDOCAINE VISCOUS) 2 % Soln Lidocaine Viscous 2 % mucosal solution   SWISH AND SPIT 15 milliliters by mouth TWO TO THREE TIMES A DAY       meloxicam (MOBIC) 15 MG tablet take 1 tablet by mouth once daily if needed for pain  90 tablet 1    methadone (DOLOPHINE) 10 MG tablet methadone 10 mg tablet   TAKE 1/2 TABLETS BY MOUTH EVERY 12 HOURS AS NEEDED FOR PAIN       metoprolol tartrate (LOPRESSOR) 25 MG tablet Take 1 tablet (25 mg total) by mouth 2 (two) times daily.  180 tablet 3     montelukast (SINGULAIR) 10 mg tablet Take 1 tablet (10 mg total) by mouth every evening.  90 tablet 3    mycophenolate (CELLCEPT) 500 mg Tab mycophenolate mofetil 500 mg tablet       nystatin (MYCOSTATIN) 100,000 unit/mL suspension Take 5 mLs (500,000 Units total) by mouth after meals as needed.  473 mL 1    ondansetron (ZOFRAN) 4 MG tablet Take 1 tablet (4 mg total) by mouth every 8 (eight) hours as needed for Nausea.  90 tablet 3    OXYCODONE HCL/ACETAMINOPHEN (PERCOCET ORAL) Take  mg by mouth 3 (three) times daily. 4/20/2016: Received from: [facility] Received Sig: three times a day      pantoprazole (PROTONIX) 40 MG tablet Take 1 tablet (40 mg total) by mouth once daily.  90 tablet 3    phenazopyridine (PYRIDIUM) 100 MG tablet Take 1 tablet (100 mg total) by mouth 3 (three) times daily with meals.  270 tablet 3    pramipexole (MIRAPEX) 0.25 MG tablet Take 1 tablet (0.25 mg total) by mouth once daily.  90 tablet 3    sertraline (ZOLOFT) 50 MG tablet Take 1 tablet (50 mg total) by mouth once daily.  90 tablet 3    tiZANidine (ZANAFLEX) 2 MG tablet Take 1 tablet (2 mg total) by mouth every 8 (eight) hours as needed.  90 tablet 1    topiramate (TOPAMAX) 25 MG tablet Take 1 tablet (25 mg total) by mouth once daily.  90 tablet 3    traZODone (DESYREL) 100 MG tablet Take 1 tablet (100 mg total) by mouth every evening.  90 tablet 3    [DISCONTINUED] fluconazole (DIFLUCAN) 100 MG tablet Take 1 tablet (100 mg total) by mouth once daily.  90 tablet 1    eszopiclone (LUNESTA) 3 mg Tab Take 1 tablet (3 mg total) by mouth every evening.  30 tablet 0     No facility-administered encounter medications on file as of 12/2/2019.         Review of patient's allergies indicates:   Allergen Reactions    Codeine Itching    Adhesive     Adhesive tape-silicones     Doxycycline     Latex     Methotrexate analogues     Sulfa (sulfonamide antibiotics)     Toradol  [ketorolac]     Gabapentin Rash       Physical Exam      Vital Signs  Temp: 98 °F (36.7 °C)  Temp src: Oral  Pulse: 104  SpO2: 97 %  BP: 114/64  BP Location: Left arm  Patient Position: Sitting  Pain Score:   9  Height and Weight  Weight: 45.9 kg (101 lb 4.8 oz)]    Physical Exam   Constitutional: She is oriented to person, place, and time. She appears well-developed and well-nourished.   HENT:   Head: Normocephalic and atraumatic.   Right Ear: External ear normal.   Left Ear: External ear normal.   Eyes: Right eye exhibits no discharge. Left eye exhibits no discharge.   Neck: Normal range of motion. No thyromegaly present.   Cardiovascular: Normal rate, regular rhythm, normal heart sounds and intact distal pulses.   No murmur heard.  Pulmonary/Chest: Effort normal and breath sounds normal. No respiratory distress.   Abdominal: Soft. Bowel sounds are normal. She exhibits no distension. There is no tenderness.   Musculoskeletal: Normal range of motion. She exhibits no deformity.   Neurological: She is alert and oriented to person, place, and time.   Skin: Skin is warm and dry. No rash noted.   Psychiatric: She has a normal mood and affect. Her behavior is normal.      Laboratory:  CBC:  No results for input(s): WBC, RBC, HGB, HCT, PLT, MCV, MCH, MCHC in the last 2160 hours.  CMP:  No results for input(s): GLU, CALCIUM, ALBUMIN, PROT, NA, K, CO2, CL, BUN, ALKPHOS, ALT, AST, BILITOT in the last 2160 hours.    Invalid input(s): CREATININ  URINALYSIS:  No results for input(s): COLORU, CLARITYU, SPECGRAV, PHUR, PROTEINUA, GLUCOSEU, BILIRUBINCON, BLOODU, WBCU, RBCU, BACTERIA, MUCUS, NITRITE, LEUKOCYTESUR, UROBILINOGEN, HYALINECASTS in the last 2160 hours.   LIPIDS:  No results for input(s): TSH, HDL, CHOL, TRIG, LDLCALC, CHOLHDL, NONHDLCHOL, TOTALCHOLEST in the last 2160 hours.  TSH:  No results for input(s): TSH in the last 2160 hours.  A1C:  No results for input(s): HGBA1C in the last 2160 hours.    Radiology:  No new  imaging    Assessment/Plan     Virginia Alexandre is a 69 y.o.female with:    1. Migraine without aura and without status migrainosus, not intractable    2. Lupus    -Continue pain meds as per pain management, will be establishing   -Continue current medications and maintain follow up with specialists.  Return to clinic in 6 months.      Sofiya Poole MD  [facility] [place]

## 2019-12-02 NOTE — ASSESSMENT & PLAN NOTE
Sees Dr. Johnson, waiting for shingles lesions to heal so she can try to do Botox injections.  Headaches have been really bad since.  Takes Topamax, Elavil, Fioricet.

## 2019-12-10 ENCOUNTER — TELEPHONE (OUTPATIENT)
Dept: FAMILY MEDICINE | Facility: CLINIC | Age: 69
End: 2019-12-10

## 2019-12-10 NOTE — TELEPHONE ENCOUNTER
----- Message from Joana Lovell sent at 12/10/2019  3:37 PM CST -----  Contact:  [phone]  Patient would like to speak with the doctor about a personal matter. Please advise

## 2019-12-10 NOTE — TELEPHONE ENCOUNTER
"Called pt. Her  wouldn't give me any info other than they want to talk to you about starting a medication.  knows which medication but he couldn't tell me because "that's the personal part" only wants a call back from you.   "

## 2019-12-11 ENCOUNTER — TELEPHONE (OUTPATIENT)
Dept: FAMILY MEDICINE | Facility: CLINIC | Age: 69
End: 2019-12-11

## 2019-12-11 NOTE — TELEPHONE ENCOUNTER
----- Message from Verena Salazar MA sent at 12/11/2019 10:48 AM CST -----      ----- Message -----  From: Jeannette Hammond  Sent: 12/11/2019  10:38 AM CST  To: [facility] Staff    Pt  Brayan called to request a call back at [phone]    Pt would like to discuss her non medical opinion product. Please give a call back.       Thank you!

## 2019-12-11 NOTE — TELEPHONE ENCOUNTER
Spoke with .  Had questions about CBD oil, med refills and rheumatology appt.  Answered all questions.

## 2019-12-12 ENCOUNTER — PATIENT OUTREACH (OUTPATIENT)
Dept: ADMINISTRATIVE | Facility: OTHER | Age: 69
End: 2019-12-12

## 2019-12-13 ENCOUNTER — OFFICE VISIT (OUTPATIENT)
Dept: RHEUMATOLOGY | Facility: CLINIC | Age: 69
End: 2019-12-13
Payer: MEDICARE

## 2019-12-13 VITALS
WEIGHT: 107.38 LBS | BODY MASS INDEX: 19.76 KG/M2 | HEART RATE: 103 BPM | DIASTOLIC BLOOD PRESSURE: 75 MMHG | SYSTOLIC BLOOD PRESSURE: 120 MMHG | HEIGHT: 62 IN

## 2019-12-13 DIAGNOSIS — M32.9 SYSTEMIC LUPUS ERYTHEMATOSUS, UNSPECIFIED SLE TYPE, UNSPECIFIED ORGAN INVOLVEMENT STATUS: Primary | ICD-10-CM

## 2019-12-13 PROCEDURE — 1125F PR PAIN SEVERITY QUANTIFIED, PAIN PRESENT: ICD-10-PCS | Mod: ,,, | Performed by: INTERNAL MEDICINE

## 2019-12-13 PROCEDURE — 1125F AMNT PAIN NOTED PAIN PRSNT: CPT | Mod: ,,, | Performed by: INTERNAL MEDICINE

## 2019-12-13 PROCEDURE — 99999 PR PBB SHADOW E&M-EST. PATIENT-LVL III: ICD-10-PCS | Mod: PBBFAC,,, | Performed by: INTERNAL MEDICINE

## 2019-12-13 PROCEDURE — 99999 PR PBB SHADOW E&M-EST. PATIENT-LVL III: CPT | Mod: PBBFAC,,, | Performed by: INTERNAL MEDICINE

## 2019-12-13 PROCEDURE — 99204 OFFICE O/P NEW MOD 45 MIN: CPT | Mod: S$PBB,,, | Performed by: INTERNAL MEDICINE

## 2019-12-13 PROCEDURE — 99213 OFFICE O/P EST LOW 20 MIN: CPT | Mod: PBBFAC | Performed by: INTERNAL MEDICINE

## 2019-12-13 PROCEDURE — 1159F MED LIST DOCD IN RCRD: CPT | Mod: ,,, | Performed by: INTERNAL MEDICINE

## 2019-12-13 PROCEDURE — 99204 PR OFFICE/OUTPT VISIT, NEW, LEVL IV, 45-59 MIN: ICD-10-PCS | Mod: S$PBB,,, | Performed by: INTERNAL MEDICINE

## 2019-12-13 PROCEDURE — 1159F PR MEDICATION LIST DOCUMENTED IN MEDICAL RECORD: ICD-10-PCS | Mod: ,,, | Performed by: INTERNAL MEDICINE

## 2019-12-13 RX ORDER — DOXEPIN HYDROCHLORIDE 10 MG/1
CAPSULE ORAL
COMMUNITY
Start: 2019-12-09

## 2019-12-13 ASSESSMENT — ROUTINE ASSESSMENT OF PATIENT INDEX DATA (RAPID3)
PSYCHOLOGICAL DISTRESS SCORE: 1.1
MDHAQ FUNCTION SCORE: .2
PAIN SCORE: 9
PATIENT GLOBAL ASSESSMENT SCORE: 8
FATIGUE SCORE: 0
AM STIFFNESS SCORE: 0, NO
TOTAL RAPID3 SCORE: 5.89

## 2019-12-13 ASSESSMENT — SYSTEMIC LUPUS ERYTHEMATOSUS DISEASE ACTIVITY INDEX (SLEDAI): TOTAL_SCORE: 0

## 2019-12-13 NOTE — PROGRESS NOTES
Chief Complaint   Patient presents with    Disease Management     systemic lupus       Patient was referred by : self     History of presenting illness    69 year old white female has systemic lupus since age 19.    They attributed it to the implants from the double radical mastectomy and drainage from the implants.  Breast cancer at age 31    She has had 8 sets of breast implants placed and every time she had an implant placed she had a problem    Systemic lupus : fatigue/weight loss/vomiting/diarrhea/skin rash  She was seeing  at [facility]  She has seen him 2 years ago    On cellcept for the lupus    She has   Recurrent shingles+    Left knee was replaced two years ago    Usually she does well   Sometimes she can get fatigue,nausea,poor appetite,fluctuating weight,hypotensive   If she rests,hydrates well she will be fine in few days     She gets anemic and needs blood transfusions  She had pernicious anemia while pregnant but otherwise we dont know the cause     Migraines+on topamax and elavil and fioricet,botox injections  Depression on elavil and zoloft   Trigeminal neuralgia needed brain surgeries   She is s/p blepharoplasty and ptosis repair     Recurrent ear infections and hearing loss,sees ENT     GERD on nexium    Chronic pain syndrome  On methadone,percocet,elavil and mobic    No heart,lung and kidney lupus    Ct Chest 2019 august : Chronic apical pleural and parenchymal scarring with interval increase in pleural nodularity along the minor fissure, and in the superior left major fissure. New 5 mm solid nodule in the right lower lobe.  Lung-RADS category: 3, probably benign  Recommended follow-up: Chest CT in 6 months.    She has cardiology and pulmonary at      Past history : depression,lupus,migraine,GERD,shingles     Family history : diabetes,throat cancer    Social history : current smoker       Review of Systems      No skin rashes,malar rash,photosensitivity   Recurrent shingles  No  telangiectasias   No calcinosis   No psoriasis   No patchy alopecia   No oral and nasal ulcers   Gets dry eyes  No pleurisy or any cardiopulmonary complaints   No dysphagia,diplopia and dysphonia and muscle weakness   No n/v/d/c   No acid reflux+   No raynaud's+   No digital ulcers   No cytopenias OTHER than anemia  No renal issues   No blood clots   No pregnancy losses/pre term deliveries /pregnancy complications   One pregnancy she had pernicious anemia  No recurrent conjunctivitis or uveitis or scleritis or episcleritis   No chronic or bloody diarrhea with no u colitis or crohn's /inflammatory bowel disease   No vaginal or urethral  d/c/STDs/no ulcers     There is currently no information documented on the homunculus. Go to the Rheumatology activity and complete the homunculus joint exam.    Physical Exam   Constitutional: She is oriented to person, place, and time and well-developed, well-nourished, and in no distress. No distress.   HENT:   Head: Normocephalic.   Mouth/Throat: Oropharynx is clear and moist.   Eyes: Conjunctivae are normal. Pupils are equal, round, and reactive to light. Right eye exhibits no discharge. Left eye exhibits no discharge. No scleral icterus.   Neck: Normal range of motion. No thyromegaly present.   Cardiovascular: Normal rate, regular rhythm, normal heart sounds and intact distal pulses.    Pulmonary/Chest: Effort normal and breath sounds normal. No stridor.   Abdominal: Soft. Bowel sounds are normal.   Lymphadenopathy:     She has no cervical adenopathy.   Neurological: She is alert and oriented to person, place, and time.   Skin: Skin is warm. No rash noted. She is not diaphoretic.     Psychiatric: Affect and judgment normal.   Musculoskeletal: Normal range of motion.       She has healing shingles rash on her forehead  She has no lupus rashes today    Joint exam : unremarkable today    Assessment     I am seeing this 69 year old white female,who is a chronic and current smoker  who claims to have a diagnosis of systemic lupus when she had drainage from her breast implants at age 19???  She said she didn't know the implants were leaking and that released lot of toxic chemicals and she developed fatigue,weight loss,vomiting,diarrhea,skin rash  She was evaluated and systemic lupus due to the implants was the diagnosis    She was seeing  at [facility]  He treated her for systemic lupus until he retired  She has seen him 2 years ago    On cellcept for the lupus  She only takes one tab a day and for months she doesn't take it and doesn't notice a difference     She has   Recurrent shingles+ and she has one now    Left knee was replaced two years ago    Usually she does well   Sometimes she can get fatigue,nausea,poor appetite,fluctuating weight,hypotensive   If she rests,hydrates well she will be fine in few days     She gets anemic and needs blood transfusions  She had pernicious anemia while pregnant but otherwise we dont know the cause     Migraines+on topamax and elavil and fioricet,botox injections  Depression on elavil and zoloft   Trigeminal neuralgia needed brain surgeries   She is s/p blepharoplasty and ptosis repair     Recurrent ear infections and hearing loss,sees ENT     GERD on nexium    Chronic pain syndrome  On methadone,percocet,elavil and mobic    No heart,lung and kidney lupus  There is a CT chest from aug 2019 revealing scarring and lung nodules    Ct Chest 2019 august : Chronic apical pleural and parenchymal scarring with interval increase in pleural nodularity along the minor fissure, and in the superior left major fissure. New 5 mm solid nodule in the right lower lobe.  Lung-RADS category: 3, probably benign  Recommended follow-up: Chest CT in 6 months.    Review of systems for systemic lupus negative generally  She does admit to dry eyes and mouth        1. Systemic lupus erythematosus, unspecified SLE type, unspecified organ involvement status            New  problem     Plan    I dont see that she has any symptoms that concern me for systemic lupus    She doesn't take cellcept currently/when she took it she only took 500 mg daily which wouldn't have helped her    Majority of her symptoms are not lupus related and she has many many specialty doctors    I will run the lupus panel mostly because she asks me to make sure her lupus is in remission    I dont need to see her anymore    She said she will see a [place] rheumatologist once or twice a year since she is worried that her lupus can flare anytime,we gave her a follow up with /Cam Coleman was seen today for disease management.    Diagnoses and all orders for this visit:    Systemic lupus erythematosus, unspecified SLE type, unspecified organ involvement status  -     KENNEDY Screen w/Reflex; Future  -     Sjogrens syndrome-A extractable nuclear antibody; Future  -     Anti-DNA antibody, double-stranded; Future  -     C3 complement; Future  -     C4 complement; Future  -     Protein / creatinine ratio, urine; Future  -     Urinalysis; Future  -     CBC auto differential; Future  -     Comprehensive metabolic panel; Future  -     Sedimentation rate; Future  -     C-reactive protein; Future  -     Rheumatoid factor; Future  -     Cyclic citrul peptide antibody, IgG; Future

## 2020-01-07 ENCOUNTER — TELEPHONE (OUTPATIENT)
Dept: FAMILY MEDICINE | Facility: CLINIC | Age: 70
End: 2020-01-07

## 2020-01-07 RX ORDER — PHENAZOPYRIDINE HYDROCHLORIDE 100 MG/1
100 TABLET, FILM COATED ORAL
Qty: 90 TABLET | Refills: 1 | Status: SHIPPED | OUTPATIENT
Start: 2020-01-07

## 2020-01-07 NOTE — TELEPHONE ENCOUNTER
----- Message from Lacey Vail sent at 1/7/2020 12:57 PM CST -----  Contact: [phone]-self  Patient is requesting a call back concerning having a bladder infection and would like a prescription for   phenazopyridine (PYRIDIUM) 100 MG tablet sent to the Pharmacy. Please call

## 2020-01-29 ENCOUNTER — OFFICE VISIT (OUTPATIENT)
Dept: FAMILY MEDICINE | Facility: CLINIC | Age: 70
End: 2020-01-29
Payer: MEDICARE

## 2020-01-29 VITALS
DIASTOLIC BLOOD PRESSURE: 72 MMHG | SYSTOLIC BLOOD PRESSURE: 118 MMHG | HEART RATE: 72 BPM | BODY MASS INDEX: 19.32 KG/M2 | WEIGHT: 103.94 LBS | TEMPERATURE: 98 F

## 2020-01-29 DIAGNOSIS — Z86.19 HISTORY OF SHINGLES: ICD-10-CM

## 2020-01-29 DIAGNOSIS — F33.9 RECURRENT MAJOR DEPRESSIVE DISORDER, REMISSION STATUS UNSPECIFIED: ICD-10-CM

## 2020-01-29 DIAGNOSIS — E64.0 SEQUELAE OF PROTEIN-CALORIE MALNUTRITION: ICD-10-CM

## 2020-01-29 DIAGNOSIS — G43.009 MIGRAINE WITHOUT AURA AND WITHOUT STATUS MIGRAINOSUS, NOT INTRACTABLE: ICD-10-CM

## 2020-01-29 DIAGNOSIS — G89.29 CHRONIC PAIN OF LEFT KNEE: ICD-10-CM

## 2020-01-29 DIAGNOSIS — M32.9 LUPUS: ICD-10-CM

## 2020-01-29 DIAGNOSIS — H60.312 CHRONIC DIFFUSE OTITIS EXTERNA OF LEFT EAR: ICD-10-CM

## 2020-01-29 DIAGNOSIS — G89.29 OTHER CHRONIC PAIN: ICD-10-CM

## 2020-01-29 DIAGNOSIS — Z72.0 TOBACCO USER: ICD-10-CM

## 2020-01-29 DIAGNOSIS — M25.562 CHRONIC PAIN OF LEFT KNEE: ICD-10-CM

## 2020-01-29 DIAGNOSIS — Z11.59 NEED FOR HEPATITIS C SCREENING TEST: Primary | ICD-10-CM

## 2020-01-29 PROCEDURE — 1125F PR PAIN SEVERITY QUANTIFIED, PAIN PRESENT: ICD-10-PCS | Mod: ,,, | Performed by: INTERNAL MEDICINE

## 2020-01-29 PROCEDURE — 1125F AMNT PAIN NOTED PAIN PRSNT: CPT | Mod: ,,, | Performed by: INTERNAL MEDICINE

## 2020-01-29 PROCEDURE — 99214 PR OFFICE/OUTPT VISIT, EST, LEVL IV, 30-39 MIN: ICD-10-PCS | Mod: S$PBB,,, | Performed by: INTERNAL MEDICINE

## 2020-01-29 PROCEDURE — 99214 OFFICE O/P EST MOD 30 MIN: CPT | Mod: PBBFAC,PN | Performed by: INTERNAL MEDICINE

## 2020-01-29 PROCEDURE — 99214 OFFICE O/P EST MOD 30 MIN: CPT | Mod: S$PBB,,, | Performed by: INTERNAL MEDICINE

## 2020-01-29 PROCEDURE — 99999 PR PBB SHADOW E&M-EST. PATIENT-LVL IV: ICD-10-PCS | Mod: PBBFAC,,, | Performed by: INTERNAL MEDICINE

## 2020-01-29 PROCEDURE — 1159F MED LIST DOCD IN RCRD: CPT | Mod: ,,, | Performed by: INTERNAL MEDICINE

## 2020-01-29 PROCEDURE — 1159F PR MEDICATION LIST DOCUMENTED IN MEDICAL RECORD: ICD-10-PCS | Mod: ,,, | Performed by: INTERNAL MEDICINE

## 2020-01-29 PROCEDURE — 99999 PR PBB SHADOW E&M-EST. PATIENT-LVL IV: CPT | Mod: PBBFAC,,, | Performed by: INTERNAL MEDICINE

## 2020-01-29 RX ORDER — ESZOPICLONE 3 MG/1
3 TABLET, FILM COATED ORAL NIGHTLY
Qty: 30 TABLET | Refills: 5 | Status: SHIPPED | OUTPATIENT
Start: 2020-01-29 | End: 2020-02-04 | Stop reason: SDUPTHER

## 2020-01-29 RX ORDER — FLUCONAZOLE 100 MG/1
100 TABLET ORAL DAILY PRN
Qty: 90 TABLET | Refills: 3 | Status: SHIPPED | OUTPATIENT
Start: 2020-01-29

## 2020-01-29 RX ORDER — ESZOPICLONE 3 MG/1
3 TABLET, FILM COATED ORAL NIGHTLY
COMMUNITY
Start: 2015-04-06 | End: 2020-01-29 | Stop reason: SDUPTHER

## 2020-01-29 NOTE — ASSESSMENT & PLAN NOTE
Sees ENT Dr. Toribio, doing oral dipro and otic cipro right now for ear infection.  Says that he told her that it was very bad.  No fever, did have some pain.  Follow up in 2 weeks.

## 2020-01-29 NOTE — PROGRESS NOTES
[facility] Note    Chief Complaint      Chief Complaint   Patient presents with    Follow-up     3m f/u      History of Present Illness      Virginia Alexandre is a 69 y.o. female who presents today for follow up of migraine, depression.  Patient comes to appointment with .     Problem List Items Addressed This Visit     Lupus    Current Assessment & Plan     Questionable dx per Rheum.  On cellcept once daily (underdosed).  Labs pending, has not had drawn yet.         External otitis of left ear    Current Assessment & Plan     Sees ENT Dr. Toribio, doing oral dipro and otic cipro right now for ear infection.  Says that he told her that it was very bad.  No fever, did have some pain.  Follow up in 2 weeks.         Migraine    Current Assessment & Plan     Sees Dr. Johnson, waiting for shingles lesions to heal so she can try to do Botox injections.  Takes Topamax, Elavil, Fioricet.         Depression    Current Assessment & Plan     On Elavil and Zoloft 50 mg daily.  No SI/HI/panic attacks.         Other chronic pain    Current Assessment & Plan     Sees Dr. Wilson, on methadone, percocet, elavil, mobic.         Tobacco user    Relevant Orders    Lipid panel    Chronic pain of left knee    Current Assessment & Plan     Worse since left knee replacement, sees Dr. Disla.  May need repeat replacement, she doesn't want to.         History of shingles    Current Assessment & Plan     Gets recurrent shingles on forehead.           Other Visit Diagnoses     Need for hepatitis C screening test    -  Primary    Relevant Orders    Hepatitis C antibody    Sequelae of protein-calorie malnutrition         Relevant Orders    Lipid panel          Health Maintenance   Topic Date Due    Hepatitis C Screening  1950    Lipid Panel  1950    Fecal Occult Blood Test (FOBT)/FitKit  1950    DEXA SCAN  03/20/1989    Pneumococcal Vaccine (65+ Low/Medium Risk) (1 of 2 - PCV13)  03/20/2014    TETANUS VACCINE  12/16/2020       Past Medical History:   Diagnosis Date    Breast cancer 1983    bilateral mastectomy, chemo, XRT    Depression     GERD (gastroesophageal reflux disease)     Lupus     Migraine     Shingles        Past Surgical History:   Procedure Laterality Date    APPENDECTOMY  1965    AUGMENTATION OF BREAST      BRAIN SURGERY  1980's    x4 for trigeminal neuralgia    BREAST BIOPSY      BREAST RECONSTRUCTION      BREAST SURGERY      - silicone implants replaced x 2 due to leak    CHOLECYSTECTOMY  1979    MASTECTOMY Bilateral 1983    TONSILLECTOMY, ADENOIDECTOMY  1950       family history includes Diabetes in her mother; Miscarriages / Stillbirths in her mother; Throat cancer in her brother.    Social History     Tobacco Use    Smoking status: Current Every Day Smoker     Packs/day: 0.50     Types: Cigarettes    Smokeless tobacco: Never Used   Substance Use Topics    Alcohol use: No    Drug use: Not on file       Review of Systems   Constitutional: Negative for chills and fever.   HENT: Negative for congestion and sore throat.    Eyes: Negative for blurred vision and discharge.   Respiratory: Negative for cough and shortness of breath.    Cardiovascular: Negative for chest pain and palpitations.   Gastrointestinal: Negative for constipation, diarrhea, nausea and vomiting.   Genitourinary: Negative for dysuria and hematuria.   Musculoskeletal: Negative for falls and myalgias.   Skin: Negative for itching and rash.   Neurological: Negative for dizziness and headaches.        Outpatient Encounter Medications as of 1/29/2020   Medication Sig Note Dispense Refill    albuterol (PROVENTIL) 2.5 mg /3 mL (0.083 %) nebulizer solution Take 3 mLs (2.5 mg total) by nebulization every 4 (four) hours as needed for Wheezing. Rescue  90 each 5    amitriptyline (ELAVIL) 50 MG tablet Take 1 tablet (50 mg total) by mouth every evening.  90 tablet 3     butalbital-acetaminophen-caffeine -40 mg (FIORICET, ESGIC) -40 mg per tablet Take 1 tablet by mouth daily as needed.  90 tablet 0    cycloSPORINE (RESTASIS) 0.05 % ophthalmic emulsion Place 0.4 mLs (1 drop total) into both eyes 4 (four) times daily. Restasis 0.05 % eye drops in a dropperette  60 each 5    doxepin (SINEQUAN) 10 MG capsule        eszopiclone (LUNESTA) 3 mg Tab Take 1 tablet (3 mg total) by mouth every evening.  30 tablet 5    fluconazole (DIFLUCAN) 100 MG tablet Take 1 tablet (100 mg total) by mouth daily as needed.  90 tablet 3    furosemide (LASIX) 40 MG tablet Take 0.5 tablets (20 mg total) by mouth once daily.  45 tablet 3    KLOR-CON M10 10 mEq tablet        lidocaine HCl 2% (LIDOCAINE VISCOUS) 2 % Soln Lidocaine Viscous 2 % mucosal solution   SWISH AND SPIT 15 milliliters by mouth TWO TO THREE TIMES A DAY       meloxicam (MOBIC) 15 MG tablet take 1 tablet by mouth once daily if needed for pain  90 tablet 1    methadone (DOLOPHINE) 10 MG tablet methadone 10 mg tablet   TAKE 1/2 TABLETS BY MOUTH EVERY 12 HOURS AS NEEDED FOR PAIN       montelukast (SINGULAIR) 10 mg tablet Take 1 tablet (10 mg total) by mouth every evening.  90 tablet 3    mycophenolate (CELLCEPT) 500 mg Tab mycophenolate mofetil 500 mg tablet       nystatin (MYCOSTATIN) 100,000 unit/mL suspension Take 5 mLs (500,000 Units total) by mouth after meals as needed.  473 mL 1    ondansetron (ZOFRAN) 4 MG tablet Take 1 tablet (4 mg total) by mouth every 8 (eight) hours as needed for Nausea.  90 tablet 3    OXYCODONE HCL/ACETAMINOPHEN (PERCOCET ORAL) Take  mg by mouth 3 (three) times daily. 4/20/2016: Received from: [facility] Received Sig: three times a day      pantoprazole (PROTONIX) 40 MG tablet Take 1 tablet (40 mg total) by mouth once daily.  90 tablet 3    phenazopyridine (PYRIDIUM) 100 MG tablet Take 1 tablet (100 mg total) by mouth 3 (three) times  daily with meals.  90 tablet 1    pramipexole (MIRAPEX) 0.25 MG tablet Take 1 tablet (0.25 mg total) by mouth once daily.  90 tablet 3    sertraline (ZOLOFT) 50 MG tablet Take 1 tablet (50 mg total) by mouth once daily.  90 tablet 3    tiZANidine (ZANAFLEX) 2 MG tablet Take 1 tablet (2 mg total) by mouth every 8 (eight) hours as needed.  90 tablet 1    topiramate (TOPAMAX) 25 MG tablet Take 1 tablet (25 mg total) by mouth once daily.  90 tablet 3    traZODone (DESYREL) 100 MG tablet Take 1 tablet (100 mg total) by mouth every evening.  90 tablet 3    [DISCONTINUED] eszopiclone (LUNESTA) 3 mg Tab Take 3 mg by mouth every evening.       [DISCONTINUED] fluconazole (DIFLUCAN) 100 MG tablet Take 1 tablet (100 mg total) by mouth once daily.  30 tablet 0    [DISCONTINUED] metoprolol tartrate (LOPRESSOR) 25 MG tablet Take 1 tablet (25 mg total) by mouth 2 (two) times daily. (Patient not taking: Reported on 12/13/2019)  180 tablet 3     No facility-administered encounter medications on file as of 1/29/2020.         Review of patient's allergies indicates:   Allergen Reactions    Codeine Itching    Adhesive     Adhesive tape-silicones     Doxycycline     Latex     Methotrexate analogues     Sulfa (sulfonamide antibiotics)     Toradol [ketorolac]     Gabapentin Rash       Physical Exam      Vital Signs  Temp: 98 °F (36.7 °C)  Temp src: Oral  Pulse: 72  BP: 118/72  BP Location: Left arm  Patient Position: Sitting  Pain Score:   9  Pain Loc: Knee  Height and Weight  Weight: 47.1 kg (103 lb 15.2 oz)]    Physical Exam   Constitutional: She is oriented to person, place, and time. She appears well-developed and well-nourished.   HENT:   Head: Normocephalic and atraumatic.   Right Ear: External ear normal.   Left Ear: External ear normal.   Eyes: Right eye exhibits no discharge. Left eye exhibits no discharge.   Neck: Normal range of motion. No thyromegaly present.   Cardiovascular: Normal rate, regular rhythm,  normal heart sounds and intact distal pulses.   No murmur heard.  Pulmonary/Chest: Effort normal and breath sounds normal. No respiratory distress.   Abdominal: Soft. Bowel sounds are normal. She exhibits no distension. There is no tenderness.   Musculoskeletal: Normal range of motion. She exhibits no deformity.   Neurological: She is alert and oriented to person, place, and time.   Skin: Skin is warm and dry. No rash noted.   Psychiatric: She has a normal mood and affect. Her behavior is normal.      Laboratory:  CBC:  No results for input(s): WBC, RBC, HGB, HCT, PLT, MCV, MCH, MCHC in the last 2160 hours.  CMP:  No results for input(s): GLU, CALCIUM, ALBUMIN, PROT, NA, K, CO2, CL, BUN, ALKPHOS, ALT, AST, BILITOT in the last 2160 hours.    Invalid input(s): CREATININ  URINALYSIS:  No results for input(s): COLORU, CLARITYU, SPECGRAV, PHUR, PROTEINUA, GLUCOSEU, BILIRUBINCON, BLOODU, WBCU, RBCU, BACTERIA, MUCUS, NITRITE, LEUKOCYTESUR, UROBILINOGEN, HYALINECASTS in the last 2160 hours.   LIPIDS:  No results for input(s): TSH, HDL, CHOL, TRIG, LDLCALC, CHOLHDL, NONHDLCHOL, TOTALCHOLEST in the last 2160 hours.  TSH:  No results for input(s): TSH in the last 2160 hours.  A1C:  No results for input(s): HGBA1C in the last 2160 hours.    Radiology:  No new imaging    Assessment/Plan     Virginia Alexandre is a 69 y.o.female with:    1. Lupus    2. Migraine without aura and without status migrainosus, not intractable    3. Other chronic pain    4. Recurrent major depressive disorder, remission status unspecified    5. Chronic diffuse otitis externa of left ear    6. Chronic pain of left knee    7. History of shingles    8. Need for hepatitis C screening test  - Hepatitis C antibody; Future    9. Tobacco user  - Lipid panel; Future    10. Sequelae of protein-calorie malnutrition   - Lipid panel; Future    -labs in [place], will add to what rheumatology ordered  -Continue current medications and maintain follow up with  specialists.  Return to clinic in 6 months.      Sofiya Poole MD  [facility]

## 2020-01-29 NOTE — ASSESSMENT & PLAN NOTE
Questionable dx per Rheum.  On cellcept once daily (underdosed).  Labs pending, has not had drawn yet.

## 2020-01-29 NOTE — ASSESSMENT & PLAN NOTE
Worse since left knee replacement, sees Dr. Disla.  May need repeat replacement, she doesn't want to.

## 2020-02-04 ENCOUNTER — PATIENT OUTREACH (OUTPATIENT)
Dept: ADMINISTRATIVE | Facility: OTHER | Age: 70
End: 2020-02-04

## 2020-02-04 RX ORDER — ESZOPICLONE 3 MG/1
3 TABLET, FILM COATED ORAL NIGHTLY
Qty: 90 TABLET | Refills: 3 | Status: SHIPPED | OUTPATIENT
Start: 2020-02-04

## 2020-03-09 ENCOUNTER — TELEPHONE (OUTPATIENT)
Dept: RHEUMATOLOGY | Facility: CLINIC | Age: 70
End: 2020-03-09

## 2020-03-09 NOTE — TELEPHONE ENCOUNTER
----- Message from Mira Monaco sent at 3/9/2020 11:49 AM CDT -----  Type:  Needs Medical Advice    Who Called: Brayan Pascual/   Reason: He needs to schedule prelabs prior to her appt next week  Would the patient rather a call back or a response via [facility]? call  Best Call Back Number: [phone]  Additional Information: none

## 2020-03-09 NOTE — TELEPHONE ENCOUNTER
Mira Hermosillo Staff   Caller: Unspecified (Today, 11:49 AM)             Type:  Needs Medical Advice     Who Called: Brayan Pascual/    Reason: He needs to schedule prelabs prior to her appt next week   Would the patient rather a call back or a response via [facility]? call   Best Call Back Number: [phone]   Additional Information: none      Kaity: I spoke with Mr Alexandre the patient's  and he was able to schedule the apt for the patient to come in on Thursday 03/12/2020 at 10:00am. Mr Alexandre voices understanding.

## 2020-03-10 ENCOUNTER — TELEPHONE (OUTPATIENT)
Dept: FAMILY MEDICINE | Facility: CLINIC | Age: 70
End: 2020-03-10

## 2020-03-10 NOTE — TELEPHONE ENCOUNTER
----- Message from Astrid LOCOBrenton Velasquez sent at 3/10/2020  3:03 PM CDT -----  Contact: [phone]    Pt's  is requesting to speak with you re: prescription for GI ulcers (sucralfate 1gram tab).Please advise

## 2020-03-11 ENCOUNTER — TELEPHONE (OUTPATIENT)
Dept: FAMILY MEDICINE | Facility: CLINIC | Age: 70
End: 2020-03-11

## 2020-03-11 DIAGNOSIS — K21.9 GASTROESOPHAGEAL REFLUX DISEASE WITHOUT ESOPHAGITIS: Primary | ICD-10-CM

## 2020-03-11 RX ORDER — SUCRALFATE 1 G/1
1 TABLET ORAL 3 TIMES DAILY
COMMUNITY
End: 2020-03-11

## 2020-03-11 RX ORDER — SUCRALFATE 1 G/1
1 TABLET ORAL 3 TIMES DAILY
Qty: 90 TABLET | Refills: 5 | Status: SHIPPED | OUTPATIENT
Start: 2020-03-11

## 2020-03-11 NOTE — TELEPHONE ENCOUNTER
----- Message from Leatha Moyer sent at 3/11/2020  9:12 AM CDT -----  Contact: Self [phone]  Patient Returning Your Phone Call

## 2020-03-12 ENCOUNTER — LAB VISIT (OUTPATIENT)
Dept: LAB | Facility: HOSPITAL | Age: 70
End: 2020-03-12
Attending: INTERNAL MEDICINE
Payer: MEDICARE

## 2020-03-12 DIAGNOSIS — Z11.59 NEED FOR HEPATITIS C SCREENING TEST: ICD-10-CM

## 2020-03-12 DIAGNOSIS — E64.0 SEQUELAE OF PROTEIN-CALORIE MALNUTRITION: ICD-10-CM

## 2020-03-12 DIAGNOSIS — M32.9 SYSTEMIC LUPUS ERYTHEMATOSUS, UNSPECIFIED SLE TYPE, UNSPECIFIED ORGAN INVOLVEMENT STATUS: ICD-10-CM

## 2020-03-12 DIAGNOSIS — Z72.0 TOBACCO USER: ICD-10-CM

## 2020-03-12 LAB
ALBUMIN SERPL BCP-MCNC: 3.4 G/DL (ref 3.5–5.2)
ALP SERPL-CCNC: 124 U/L (ref 55–135)
ALT SERPL W/O P-5'-P-CCNC: 9 U/L (ref 10–44)
ANION GAP SERPL CALC-SCNC: 10 MMOL/L (ref 8–16)
AST SERPL-CCNC: 22 U/L (ref 10–40)
BASOPHILS # BLD AUTO: 0.03 K/UL (ref 0–0.2)
BASOPHILS NFR BLD: 0.4 % (ref 0–1.9)
BILIRUB SERPL-MCNC: 0.2 MG/DL (ref 0.1–1)
BUN SERPL-MCNC: 14 MG/DL (ref 8–23)
C3 SERPL-MCNC: 121 MG/DL (ref 50–180)
C4 SERPL-MCNC: 35 MG/DL (ref 11–44)
CALCIUM SERPL-MCNC: 9.3 MG/DL (ref 8.7–10.5)
CCP AB SER IA-ACNC: <0.5 U/ML
CHLORIDE SERPL-SCNC: 105 MMOL/L (ref 95–110)
CHOLEST SERPL-MCNC: 185 MG/DL (ref 120–199)
CHOLEST/HDLC SERPL: 3.8 {RATIO} (ref 2–5)
CO2 SERPL-SCNC: 22 MMOL/L (ref 23–29)
CREAT SERPL-MCNC: 0.8 MG/DL (ref 0.5–1.4)
CRP SERPL-MCNC: 17.3 MG/L (ref 0–8.2)
DIFFERENTIAL METHOD: ABNORMAL
EOSINOPHIL # BLD AUTO: 0.1 K/UL (ref 0–0.5)
EOSINOPHIL NFR BLD: 1.4 % (ref 0–8)
ERYTHROCYTE [DISTWIDTH] IN BLOOD BY AUTOMATED COUNT: 13.2 % (ref 11.5–14.5)
ERYTHROCYTE [SEDIMENTATION RATE] IN BLOOD BY WESTERGREN METHOD: 19 MM/HR (ref 0–36)
EST. GFR  (AFRICAN AMERICAN): >60 ML/MIN/1.73 M^2
EST. GFR  (NON AFRICAN AMERICAN): >60 ML/MIN/1.73 M^2
GLUCOSE SERPL-MCNC: 75 MG/DL (ref 70–110)
HCT VFR BLD AUTO: 35.7 % (ref 37–48.5)
HDLC SERPL-MCNC: 49 MG/DL (ref 40–75)
HDLC SERPL: 26.5 % (ref 20–50)
HGB BLD-MCNC: 10.5 G/DL (ref 12–16)
IMM GRANULOCYTES # BLD AUTO: 0.03 K/UL (ref 0–0.04)
IMM GRANULOCYTES NFR BLD AUTO: 0.4 % (ref 0–0.5)
LDLC SERPL CALC-MCNC: 101 MG/DL (ref 63–159)
LYMPHOCYTES # BLD AUTO: 1.3 K/UL (ref 1–4.8)
LYMPHOCYTES NFR BLD: 15.1 % (ref 18–48)
MCH RBC QN AUTO: 30.9 PG (ref 27–31)
MCHC RBC AUTO-ENTMCNC: 29.4 G/DL (ref 32–36)
MCV RBC AUTO: 105 FL (ref 82–98)
MONOCYTES # BLD AUTO: 0.5 K/UL (ref 0.3–1)
MONOCYTES NFR BLD: 6.4 % (ref 4–15)
NEUTROPHILS # BLD AUTO: 6.4 K/UL (ref 1.8–7.7)
NEUTROPHILS NFR BLD: 76.3 % (ref 38–73)
NONHDLC SERPL-MCNC: 136 MG/DL
NRBC BLD-RTO: 0 /100 WBC
PLATELET # BLD AUTO: 293 K/UL (ref 150–350)
PMV BLD AUTO: 10.4 FL (ref 9.2–12.9)
POTASSIUM SERPL-SCNC: 4.7 MMOL/L (ref 3.5–5.1)
PROT SERPL-MCNC: 7.1 G/DL (ref 6–8.4)
RBC # BLD AUTO: 3.4 M/UL (ref 4–5.4)
RHEUMATOID FACT SERPL-ACNC: <10 IU/ML (ref 0–15)
SODIUM SERPL-SCNC: 137 MMOL/L (ref 136–145)
TRIGL SERPL-MCNC: 175 MG/DL (ref 30–150)
WBC # BLD AUTO: 8.4 K/UL (ref 3.9–12.7)

## 2020-03-12 PROCEDURE — 86235 NUCLEAR ANTIGEN ANTIBODY: CPT

## 2020-03-12 PROCEDURE — 86200 CCP ANTIBODY: CPT

## 2020-03-12 PROCEDURE — 85025 COMPLETE CBC W/AUTO DIFF WBC: CPT

## 2020-03-12 PROCEDURE — 86140 C-REACTIVE PROTEIN: CPT

## 2020-03-12 PROCEDURE — 86160 COMPLEMENT ANTIGEN: CPT | Mod: 59

## 2020-03-12 PROCEDURE — 86431 RHEUMATOID FACTOR QUANT: CPT

## 2020-03-12 PROCEDURE — 86225 DNA ANTIBODY NATIVE: CPT

## 2020-03-12 PROCEDURE — 80053 COMPREHEN METABOLIC PANEL: CPT

## 2020-03-12 PROCEDURE — 86038 ANTINUCLEAR ANTIBODIES: CPT

## 2020-03-12 PROCEDURE — 86160 COMPLEMENT ANTIGEN: CPT

## 2020-03-12 PROCEDURE — 85652 RBC SED RATE AUTOMATED: CPT

## 2020-03-12 PROCEDURE — 36415 COLL VENOUS BLD VENIPUNCTURE: CPT | Mod: PO

## 2020-03-12 PROCEDURE — 86803 HEPATITIS C AB TEST: CPT

## 2020-03-12 PROCEDURE — 80061 LIPID PANEL: CPT

## 2020-03-13 LAB — HCV AB SERPL QL IA: NEGATIVE

## 2020-03-16 ENCOUNTER — TELEPHONE (OUTPATIENT)
Dept: RHEUMATOLOGY | Facility: CLINIC | Age: 70
End: 2020-03-16

## 2020-03-16 LAB — DSDNA AB SER-ACNC: NORMAL [IU]/ML

## 2020-03-16 NOTE — TELEPHONE ENCOUNTER
----- Message from Jaimee Mccormack DO sent at 3/15/2020  5:40 PM CDT -----  This is a new patient scheduled to see me 3/16 at 2pm but she is in an established spot, please reschedule her to my next available new patient slot.

## 2020-03-16 NOTE — TELEPHONE ENCOUNTER
Jaimee Mccormack, DO CORINE [facility] Staff   Caller: Unspecified (Yesterday,  5:40 PM)             This is a new patient scheduled to see me 3/16 at 2pm but she is in an established spot, please reschedule her to my next available new patient slot.        Kaity: I spoke with the patient's . He confirmed   Mrs Alexandre rescheduled apt for tomorrow at 4:00pm. Mr Alexandre voices understanding.

## 2020-03-17 ENCOUNTER — TELEPHONE (OUTPATIENT)
Dept: RHEUMATOLOGY | Facility: CLINIC | Age: 70
End: 2020-03-17

## 2020-03-17 LAB
ANTI-SSA ANTIBODY: 0.2 RATIO (ref 0–0.99)
ANTI-SSA INTERPRETATION: NEGATIVE

## 2020-03-17 NOTE — TELEPHONE ENCOUNTER
Spoke with patient, will reschedule her for future date in new patient slot    ----- Message from Chelsy Leon MA sent at 3/17/2020 11:27 AM CDT -----  Contact: [phone]/ [place]      ----- Message -----  From: Sally Gonzalez  Sent: 3/17/2020  11:06 AM CDT  To: Ksenia Hermosillo Staff    Patient's  [place] is requesting a call back regarding their fear of the covid virus. The news said she should not come in. Please call him to discuss coming in if it is safe. She really needs this doctor.  Thanks

## 2020-03-18 LAB — ANA SER QL IF: NORMAL

## 2020-04-20 ENCOUNTER — TELEPHONE (OUTPATIENT)
Dept: RHEUMATOLOGY | Facility: CLINIC | Age: 70
End: 2020-04-20

## 2020-04-20 NOTE — TELEPHONE ENCOUNTER
Spoke with patient's . Notified him that due to COVID - 19 events Dr. Mccormack is doing virtual visits until after 5/16.  is open to doing a virtual visit but wants to know if it would be a good option for the patient. Notified Dr. Mccormack of 's concern.     ----- Message from Kaleb Reinoso sent at 4/20/2020  8:30 AM CDT -----  Contact: [phone] / self   Patient is returning a call from your office. Please Advise.

## 2020-04-20 NOTE — TELEPHONE ENCOUNTER
----- Message from Denae Munguia sent at 4/17/2020  5:12 PM CDT -----  Contact: Patient  Type:  Sooner Apoointment Request    Caller is requesting a sooner appointment.  Caller declined first available appointment listed below.  Caller will not accept being placed on the waitlist and is requesting a message be sent to doctor.  Name of Caller:  When is the first available appointment?5/18  Symptoms:  Would the patient rather a call back or a response via [facility]? call  Best Call Back Number:[phone]  Additional Information: Patient would just like to get a sooner appt if one is available

## 2020-04-21 ENCOUNTER — TELEPHONE (OUTPATIENT)
Dept: RHEUMATOLOGY | Facility: CLINIC | Age: 70
End: 2020-04-21

## 2020-04-21 NOTE — TELEPHONE ENCOUNTER
Spoke with patient's . Notified  that Dr. Mccormack sent message stating that virtual visit is okay. Set up patient portal with . He will have his granddaughter call to set up [facility] jose when she is available.

## 2020-04-21 NOTE — TELEPHONE ENCOUNTER
----- Message from Jaimee Mccormack DO sent at 4/20/2020  8:52 AM CDT -----  Contact: [phone] / self   Yes, virtual visit is good, please assist her  in setting this up for her.  ----- Message -----  From: Stephaine Cam LPN  Sent: 4/20/2020   8:38 AM CDT  To: DO Dr. Ksenia Smith,     Her  wants to know if she can be seen sooner. I informed him that you aren't seeing patients in person until after 5/16. He is open to setting up a portal for her but I told him that I would like to check with you first to see if a virtual visit would be a good option for this patient. Please let me know thank you.      ----- Message -----  From: Kaleb Reinoso  Sent: 4/20/2020   8:30 AM CDT  To: Ksenia Hermosillo Staff    Patient is returning a call from your office. Please Advise.

## 2020-04-21 NOTE — TELEPHONE ENCOUNTER
Spoke with patient's . [facility] jose still needs to be downloaded to patient's phone but he was unable to do it now. He stated that he would give the office a call back at his earliest convenience.

## 2020-04-21 NOTE — TELEPHONE ENCOUNTER
----- Message from Elza Bonilla sent at 4/21/2020 12:31 PM CDT -----  Contact: [phone]'s   Patient's  called in requesting to speak with you. Patient prefers to speak with a nurse. Please advise.

## 2020-05-19 ENCOUNTER — TELEPHONE (OUTPATIENT)
Dept: FAMILY MEDICINE | Facility: CLINIC | Age: 70
End: 2020-05-19

## 2020-05-19 DIAGNOSIS — R05.9 COUGH: Primary | ICD-10-CM

## 2020-05-21 ENCOUNTER — TELEPHONE (OUTPATIENT)
Dept: RHEUMATOLOGY | Facility: CLINIC | Age: 70
End: 2020-05-21

## 2020-05-21 NOTE — TELEPHONE ENCOUNTER
"----- Message from Yesenia Novak sent at 5/21/2020  8:24 AM CDT -----  Contact: Brayan Pascual ()  Patient's  called to cancel the COVID testing due to the patient's had a "bad" night with her Lupus condition.    Please call [phone] today to discuss getting it rescheduled.    "

## 2020-05-21 NOTE — TELEPHONE ENCOUNTER
"Yesenia Hermosillo Staff   Caller: Brayan Pascual () (Today,  8:24 AM)             Patient's  called to cancel the COVID testing due to the patient's had a "bad" night with her Lupus condition.     Please call [phone] today to discuss getting it rescheduled.      Kaity: I spoke to the patient's . I told him that he would have to call the office where she was scheduled to have the COVID testing done to be rescheduled. I did check the the patient's past apts and I was able to give the patient's  the number to contact the office. The patient's  voices understanding.   "

## 2020-05-26 ENCOUNTER — TELEPHONE (OUTPATIENT)
Dept: RHEUMATOLOGY | Facility: CLINIC | Age: 70
End: 2020-05-26

## 2020-05-26 ENCOUNTER — TELEPHONE (OUTPATIENT)
Dept: FAMILY MEDICINE | Facility: CLINIC | Age: 70
End: 2020-05-26

## 2020-05-26 NOTE — TELEPHONE ENCOUNTER
----- Message from Sally Gonzalez sent at 5/26/2020  1:54 PM CDT -----  Contact: [phone]// [place]   is requesting to speak with  about her passing. Thanks

## 2020-05-26 NOTE — TELEPHONE ENCOUNTER
Advised patient  Dr. Poole was out of the office for the rest of the day. He wanted to let her know of his wife passing and to thank Dr. Poole for everything she has done. Advised patient Dr. Poole will be back in the office tomorrow and will have her call him.

## 2020-05-26 NOTE — TELEPHONE ENCOUNTER
Spoke with patients  and offered him my condolences about his wife's passing. Pts  relayed to me that he found this wife upon returning home from work, per patients , he relayed there was concern about a gastric ulceration and patient aspirating on blood from gastric ulcer.  I again expressed my condolences and how sorry I was to hear about his wife,  He stated he appreciated the call.

## 2020-05-29 ENCOUNTER — TELEPHONE (OUTPATIENT)
Dept: FAMILY MEDICINE | Facility: CLINIC | Age: 70
End: 2020-05-29

## 2021-06-17 NOTE — TELEPHONE ENCOUNTER
----- Message from Jakob Valencia sent at 5/26/2020 10:32 AM CDT -----  Contact: EMMANUELLE   Type:  Needs Medical Advice    Who Called: EMMANUELLE   Reason:Notifying you of the patients death  Would the patient rather a call back or a response via [facility]? callback  Best Call Back Number: [phone]  Additional Information: none  
Spoke with , patient passed on 5/24/2020, appears to be from natural causes but final report is pending.  I will sign death certificate in [facility] once available.    Attempted to call  on cell to offer my condolences, no answer.  
Principal Discharge DX:	Chronic right-sided low back pain without sciatica

## 2023-08-25 NOTE — TELEPHONE ENCOUNTER
----- Message from Sally Gonzalez sent at 11/20/2019 11:03 AM CST -----  Contact: [phone]/self  Please send all of her rx refills sent to [facility] - [facility], [place] - [address] [place] [address].    Left message for patient at      Telephone Information:   Mobile [phone]    to schedule procedure.  Patient to return call to [facility] Scheduling Patient Line [phone]     Sent letter .